# Patient Record
Sex: MALE | Race: WHITE | Employment: OTHER | URBAN - METROPOLITAN AREA
[De-identification: names, ages, dates, MRNs, and addresses within clinical notes are randomized per-mention and may not be internally consistent; named-entity substitution may affect disease eponyms.]

---

## 2017-09-27 ENCOUNTER — OP HISTORICAL/CONVERTED ENCOUNTER (OUTPATIENT)
Dept: OTHER | Age: 82
End: 2017-09-27

## 2017-10-02 ENCOUNTER — HOSPITAL ENCOUNTER (INPATIENT)
Age: 82
LOS: 8 days | Discharge: NURSING, REHAB OR LONG-TERM CARE | DRG: 070 | End: 2017-10-10
Attending: EMERGENCY MEDICINE | Admitting: INTERNAL MEDICINE
Payer: MEDICARE

## 2017-10-02 ENCOUNTER — APPOINTMENT (OUTPATIENT)
Dept: CT IMAGING | Age: 82
DRG: 070 | End: 2017-10-02
Attending: EMERGENCY MEDICINE
Payer: MEDICARE

## 2017-10-02 ENCOUNTER — APPOINTMENT (OUTPATIENT)
Dept: GENERAL RADIOLOGY | Age: 82
DRG: 070 | End: 2017-10-02
Attending: EMERGENCY MEDICINE
Payer: MEDICARE

## 2017-10-02 DIAGNOSIS — G20 PARKINSON'S DISEASE (HCC): Chronic | ICD-10-CM

## 2017-10-02 DIAGNOSIS — R62.7 FTT (FAILURE TO THRIVE) IN ADULT: ICD-10-CM

## 2017-10-02 DIAGNOSIS — R53.81 PHYSICAL DEBILITY: ICD-10-CM

## 2017-10-02 DIAGNOSIS — R41.0 DELIRIUM: Primary | ICD-10-CM

## 2017-10-02 DIAGNOSIS — Z71.89 GOALS OF CARE, COUNSELING/DISCUSSION: ICD-10-CM

## 2017-10-02 PROBLEM — G93.40 ACUTE ENCEPHALOPATHY: Status: ACTIVE | Noted: 2017-10-02

## 2017-10-02 LAB
ALBUMIN SERPL-MCNC: 3.7 G/DL (ref 3.5–5)
ALBUMIN/GLOB SERPL: 1 {RATIO} (ref 1.1–2.2)
ALP SERPL-CCNC: 76 U/L (ref 45–117)
ALT SERPL-CCNC: 70 U/L (ref 12–78)
ANION GAP SERPL CALC-SCNC: 7 MMOL/L (ref 5–15)
APPEARANCE UR: CLEAR
AST SERPL-CCNC: 193 U/L (ref 15–37)
BACTERIA URNS QL MICRO: NEGATIVE /HPF
BASOPHILS # BLD: 0 K/UL (ref 0–0.1)
BASOPHILS NFR BLD: 0 % (ref 0–1)
BILIRUB SERPL-MCNC: 1.6 MG/DL (ref 0.2–1)
BILIRUB UR QL: NEGATIVE
BUN SERPL-MCNC: 26 MG/DL (ref 6–20)
BUN/CREAT SERPL: 27 (ref 12–20)
CALCIUM SERPL-MCNC: 9.3 MG/DL (ref 8.5–10.1)
CHLORIDE SERPL-SCNC: 103 MMOL/L (ref 97–108)
CO2 SERPL-SCNC: 29 MMOL/L (ref 21–32)
COLOR UR: ABNORMAL
CREAT SERPL-MCNC: 0.97 MG/DL (ref 0.7–1.3)
EOSINOPHIL # BLD: 0 K/UL (ref 0–0.4)
EOSINOPHIL NFR BLD: 1 % (ref 0–7)
EPITH CASTS URNS QL MICRO: ABNORMAL /LPF
ERYTHROCYTE [DISTWIDTH] IN BLOOD BY AUTOMATED COUNT: 13.5 % (ref 11.5–14.5)
GLOBULIN SER CALC-MCNC: 3.8 G/DL (ref 2–4)
GLUCOSE SERPL-MCNC: 100 MG/DL (ref 65–100)
GLUCOSE UR STRIP.AUTO-MCNC: NEGATIVE MG/DL
HCT VFR BLD AUTO: 44 % (ref 36.6–50.3)
HGB BLD-MCNC: 15.5 G/DL (ref 12.1–17)
HGB UR QL STRIP: ABNORMAL
KETONES UR QL STRIP.AUTO: NEGATIVE MG/DL
LACTATE SERPL-SCNC: 0.9 MMOL/L (ref 0.4–2)
LEUKOCYTE ESTERASE UR QL STRIP.AUTO: NEGATIVE
LYMPHOCYTES # BLD: 1 K/UL (ref 0.8–3.5)
LYMPHOCYTES NFR BLD: 17 % (ref 12–49)
MCH RBC QN AUTO: 31.8 PG (ref 26–34)
MCHC RBC AUTO-ENTMCNC: 35.2 G/DL (ref 30–36.5)
MCV RBC AUTO: 90.3 FL (ref 80–99)
MONOCYTES # BLD: 0.5 K/UL (ref 0–1)
MONOCYTES NFR BLD: 10 % (ref 5–13)
NEUTS SEG # BLD: 4 K/UL (ref 1.8–8)
NEUTS SEG NFR BLD: 72 % (ref 32–75)
NITRITE UR QL STRIP.AUTO: NEGATIVE
PH UR STRIP: 6 [PH] (ref 5–8)
PLATELET # BLD AUTO: 186 K/UL (ref 150–400)
POTASSIUM SERPL-SCNC: 4.8 MMOL/L (ref 3.5–5.1)
PROT SERPL-MCNC: 7.5 G/DL (ref 6.4–8.2)
PROT UR STRIP-MCNC: 30 MG/DL
RBC # BLD AUTO: 4.87 M/UL (ref 4.1–5.7)
RBC #/AREA URNS HPF: ABNORMAL /HPF (ref 0–5)
SODIUM SERPL-SCNC: 139 MMOL/L (ref 136–145)
SP GR UR REFRACTOMETRY: 1.03 (ref 1–1.03)
UROBILINOGEN UR QL STRIP.AUTO: 1 EU/DL (ref 0.2–1)
WBC # BLD AUTO: 5.5 K/UL (ref 4.1–11.1)
WBC URNS QL MICRO: ABNORMAL /HPF (ref 0–4)

## 2017-10-02 PROCEDURE — 93005 ELECTROCARDIOGRAM TRACING: CPT

## 2017-10-02 PROCEDURE — 71010 XR CHEST PORT: CPT

## 2017-10-02 PROCEDURE — 51702 INSERT TEMP BLADDER CATH: CPT

## 2017-10-02 PROCEDURE — 36415 COLL VENOUS BLD VENIPUNCTURE: CPT | Performed by: EMERGENCY MEDICINE

## 2017-10-02 PROCEDURE — 65660000000 HC RM CCU STEPDOWN

## 2017-10-02 PROCEDURE — 74011250636 HC RX REV CODE- 250/636: Performed by: EMERGENCY MEDICINE

## 2017-10-02 PROCEDURE — 77030034850

## 2017-10-02 PROCEDURE — 85025 COMPLETE CBC W/AUTO DIFF WBC: CPT | Performed by: EMERGENCY MEDICINE

## 2017-10-02 PROCEDURE — 77030011943

## 2017-10-02 PROCEDURE — 96360 HYDRATION IV INFUSION INIT: CPT

## 2017-10-02 PROCEDURE — 80053 COMPREHEN METABOLIC PANEL: CPT | Performed by: EMERGENCY MEDICINE

## 2017-10-02 PROCEDURE — 87040 BLOOD CULTURE FOR BACTERIA: CPT | Performed by: EMERGENCY MEDICINE

## 2017-10-02 PROCEDURE — 70450 CT HEAD/BRAIN W/O DYE: CPT

## 2017-10-02 PROCEDURE — 99285 EMERGENCY DEPT VISIT HI MDM: CPT

## 2017-10-02 PROCEDURE — 77030011256 HC DRSG MEPILEX <16IN NO BORD MOLN -A

## 2017-10-02 PROCEDURE — 96361 HYDRATE IV INFUSION ADD-ON: CPT

## 2017-10-02 PROCEDURE — 74011250637 HC RX REV CODE- 250/637: Performed by: EMERGENCY MEDICINE

## 2017-10-02 PROCEDURE — 81001 URINALYSIS AUTO W/SCOPE: CPT | Performed by: EMERGENCY MEDICINE

## 2017-10-02 PROCEDURE — 83605 ASSAY OF LACTIC ACID: CPT | Performed by: EMERGENCY MEDICINE

## 2017-10-02 RX ORDER — LEVOFLOXACIN 5 MG/ML
750 INJECTION, SOLUTION INTRAVENOUS EVERY 24 HOURS
Status: DISCONTINUED | OUTPATIENT
Start: 2017-10-02 | End: 2017-10-04

## 2017-10-02 RX ORDER — ACETAMINOPHEN 650 MG/1
650 SUPPOSITORY RECTAL
Status: COMPLETED | OUTPATIENT
Start: 2017-10-02 | End: 2017-10-02

## 2017-10-02 RX ORDER — SODIUM CHLORIDE 0.9 % (FLUSH) 0.9 %
5-10 SYRINGE (ML) INJECTION AS NEEDED
Status: DISCONTINUED | OUTPATIENT
Start: 2017-10-02 | End: 2017-10-10 | Stop reason: HOSPADM

## 2017-10-02 RX ADMIN — ACETAMINOPHEN 650 MG: 650 SUPPOSITORY RECTAL at 20:39

## 2017-10-02 RX ADMIN — SODIUM CHLORIDE 2382 ML: 900 INJECTION, SOLUTION INTRAVENOUS at 20:14

## 2017-10-03 PROBLEM — J20.9 ACUTE BRONCHITIS: Status: ACTIVE | Noted: 2017-10-03

## 2017-10-03 PROBLEM — R53.81 PHYSICAL DEBILITY: Status: ACTIVE | Noted: 2017-10-03

## 2017-10-03 PROBLEM — F03.90 DEMENTIA WITHOUT BEHAVIORAL DISTURBANCE (HCC): Status: ACTIVE | Noted: 2017-10-03

## 2017-10-03 PROBLEM — Z96.89 S/P DEEP BRAIN STIMULATOR PLACEMENT: Chronic | Status: ACTIVE | Noted: 2017-10-03

## 2017-10-03 PROBLEM — G20 PARKINSON'S DISEASE (HCC): Chronic | Status: ACTIVE | Noted: 2017-10-03

## 2017-10-03 LAB
ALBUMIN SERPL-MCNC: 2.6 G/DL (ref 3.5–5)
ALBUMIN/GLOB SERPL: 0.8 {RATIO} (ref 1.1–2.2)
ALP SERPL-CCNC: 54 U/L (ref 45–117)
ALT SERPL-CCNC: 58 U/L (ref 12–78)
AMMONIA PLAS-SCNC: <10 UMOL/L
ANION GAP SERPL CALC-SCNC: 10 MMOL/L (ref 5–15)
AST SERPL-CCNC: 183 U/L (ref 15–37)
ATRIAL RATE: 71 BPM
BASOPHILS # BLD: 0 K/UL (ref 0–0.1)
BASOPHILS NFR BLD: 1 % (ref 0–1)
BILIRUB SERPL-MCNC: 1.5 MG/DL (ref 0.2–1)
BUN SERPL-MCNC: 23 MG/DL (ref 6–20)
BUN/CREAT SERPL: 36 (ref 12–20)
CALCIUM SERPL-MCNC: 7.4 MG/DL (ref 8.5–10.1)
CALCULATED P AXIS, ECG09: 73 DEGREES
CALCULATED R AXIS, ECG10: -58 DEGREES
CALCULATED T AXIS, ECG11: 21 DEGREES
CHLORIDE SERPL-SCNC: 110 MMOL/L (ref 97–108)
CO2 SERPL-SCNC: 23 MMOL/L (ref 21–32)
CREAT SERPL-MCNC: 0.64 MG/DL (ref 0.7–1.3)
DIAGNOSIS, 93000: NORMAL
EOSINOPHIL # BLD: 0.1 K/UL (ref 0–0.4)
EOSINOPHIL NFR BLD: 2 % (ref 0–7)
ERYTHROCYTE [DISTWIDTH] IN BLOOD BY AUTOMATED COUNT: 13.9 % (ref 11.5–14.5)
GLOBULIN SER CALC-MCNC: 3.1 G/DL (ref 2–4)
GLUCOSE SERPL-MCNC: 81 MG/DL (ref 65–100)
HCT VFR BLD AUTO: 36.1 % (ref 36.6–50.3)
HGB BLD-MCNC: 11.8 G/DL (ref 12.1–17)
LACTATE SERPL-SCNC: 0.8 MMOL/L (ref 0.4–2)
LYMPHOCYTES # BLD: 1 K/UL (ref 0.8–3.5)
LYMPHOCYTES NFR BLD: 23 % (ref 12–49)
MCH RBC QN AUTO: 30.6 PG (ref 26–34)
MCHC RBC AUTO-ENTMCNC: 32.7 G/DL (ref 30–36.5)
MCV RBC AUTO: 93.5 FL (ref 80–99)
MONOCYTES # BLD: 0.4 K/UL (ref 0–1)
MONOCYTES NFR BLD: 9 % (ref 5–13)
NEUTS SEG # BLD: 2.9 K/UL (ref 1.8–8)
NEUTS SEG NFR BLD: 65 % (ref 32–75)
P-R INTERVAL, ECG05: 152 MS
PLATELET # BLD AUTO: 146 K/UL (ref 150–400)
POTASSIUM SERPL-SCNC: 4.4 MMOL/L (ref 3.5–5.1)
PROT SERPL-MCNC: 5.7 G/DL (ref 6.4–8.2)
Q-T INTERVAL, ECG07: 400 MS
QRS DURATION, ECG06: 84 MS
QTC CALCULATION (BEZET), ECG08: 434 MS
RBC # BLD AUTO: 3.86 M/UL (ref 4.1–5.7)
SODIUM SERPL-SCNC: 143 MMOL/L (ref 136–145)
VENTRICULAR RATE, ECG03: 71 BPM
WBC # BLD AUTO: 4.4 K/UL (ref 4.1–11.1)

## 2017-10-03 PROCEDURE — 74011250637 HC RX REV CODE- 250/637: Performed by: NURSE PRACTITIONER

## 2017-10-03 PROCEDURE — 85025 COMPLETE CBC W/AUTO DIFF WBC: CPT | Performed by: INTERNAL MEDICINE

## 2017-10-03 PROCEDURE — 83605 ASSAY OF LACTIC ACID: CPT | Performed by: EMERGENCY MEDICINE

## 2017-10-03 PROCEDURE — 65270000029 HC RM PRIVATE

## 2017-10-03 PROCEDURE — 36415 COLL VENOUS BLD VENIPUNCTURE: CPT | Performed by: EMERGENCY MEDICINE

## 2017-10-03 PROCEDURE — 97535 SELF CARE MNGMENT TRAINING: CPT

## 2017-10-03 PROCEDURE — 76450000000

## 2017-10-03 PROCEDURE — 80053 COMPREHEN METABOLIC PANEL: CPT | Performed by: INTERNAL MEDICINE

## 2017-10-03 PROCEDURE — 97165 OT EVAL LOW COMPLEX 30 MIN: CPT

## 2017-10-03 PROCEDURE — 97161 PT EVAL LOW COMPLEX 20 MIN: CPT

## 2017-10-03 PROCEDURE — 97116 GAIT TRAINING THERAPY: CPT

## 2017-10-03 PROCEDURE — 74011250636 HC RX REV CODE- 250/636: Performed by: INTERNAL MEDICINE

## 2017-10-03 PROCEDURE — 65660000000 HC RM CCU STEPDOWN

## 2017-10-03 PROCEDURE — 95816 EEG AWAKE AND DROWSY: CPT | Performed by: NURSE PRACTITIONER

## 2017-10-03 PROCEDURE — 82140 ASSAY OF AMMONIA: CPT | Performed by: NURSE PRACTITIONER

## 2017-10-03 RX ORDER — NALOXONE HYDROCHLORIDE 0.4 MG/ML
0.4 INJECTION, SOLUTION INTRAMUSCULAR; INTRAVENOUS; SUBCUTANEOUS AS NEEDED
Status: DISCONTINUED | OUTPATIENT
Start: 2017-10-03 | End: 2017-10-10 | Stop reason: HOSPADM

## 2017-10-03 RX ORDER — SODIUM CHLORIDE 9 MG/ML
75 INJECTION, SOLUTION INTRAVENOUS CONTINUOUS
Status: DISCONTINUED | OUTPATIENT
Start: 2017-10-03 | End: 2017-10-07

## 2017-10-03 RX ORDER — MORPHINE SULFATE 2 MG/ML
1 INJECTION, SOLUTION INTRAMUSCULAR; INTRAVENOUS
Status: DISCONTINUED | OUTPATIENT
Start: 2017-10-03 | End: 2017-10-10 | Stop reason: HOSPADM

## 2017-10-03 RX ORDER — ACETAMINOPHEN 325 MG/1
650 TABLET ORAL
Status: DISCONTINUED | OUTPATIENT
Start: 2017-10-03 | End: 2017-10-10 | Stop reason: HOSPADM

## 2017-10-03 RX ORDER — CARBIDOPA AND LEVODOPA 25; 100 MG/1; MG/1
1 TABLET, EXTENDED RELEASE ORAL 4 TIMES DAILY
Status: ON HOLD | COMMUNITY
End: 2017-10-07

## 2017-10-03 RX ORDER — SODIUM CHLORIDE 0.9 % (FLUSH) 0.9 %
5-10 SYRINGE (ML) INJECTION EVERY 8 HOURS
Status: DISCONTINUED | OUTPATIENT
Start: 2017-10-03 | End: 2017-10-10 | Stop reason: HOSPADM

## 2017-10-03 RX ORDER — PROCHLORPERAZINE EDISYLATE 5 MG/ML
5 INJECTION INTRAMUSCULAR; INTRAVENOUS
Status: DISCONTINUED | OUTPATIENT
Start: 2017-10-03 | End: 2017-10-10 | Stop reason: HOSPADM

## 2017-10-03 RX ORDER — ENOXAPARIN SODIUM 100 MG/ML
40 INJECTION SUBCUTANEOUS EVERY 24 HOURS
Status: DISCONTINUED | OUTPATIENT
Start: 2017-10-03 | End: 2017-10-10 | Stop reason: HOSPADM

## 2017-10-03 RX ORDER — SODIUM CHLORIDE 0.9 % (FLUSH) 0.9 %
5-10 SYRINGE (ML) INJECTION AS NEEDED
Status: DISCONTINUED | OUTPATIENT
Start: 2017-10-03 | End: 2017-10-10 | Stop reason: HOSPADM

## 2017-10-03 RX ORDER — CARBIDOPA AND LEVODOPA 25; 100 MG/1; MG/1
1 TABLET, EXTENDED RELEASE ORAL 4 TIMES DAILY
Status: DISCONTINUED | OUTPATIENT
Start: 2017-10-03 | End: 2017-10-05

## 2017-10-03 RX ORDER — DM/PE/ACETAMINOPHEN/CHLORPHENR 10-5-325-2
1 TABLET, SEQUENTIAL ORAL DAILY
COMMUNITY

## 2017-10-03 RX ORDER — THERA TABS 400 MCG
1 TAB ORAL DAILY
COMMUNITY

## 2017-10-03 RX ADMIN — Medication 10 ML: at 17:43

## 2017-10-03 RX ADMIN — LEVOFLOXACIN 750 MG: 5 INJECTION, SOLUTION INTRAVENOUS at 21:09

## 2017-10-03 RX ADMIN — ENOXAPARIN SODIUM 40 MG: 40 INJECTION SUBCUTANEOUS at 02:19

## 2017-10-03 RX ADMIN — SODIUM CHLORIDE 75 ML/HR: 900 INJECTION, SOLUTION INTRAVENOUS at 02:20

## 2017-10-03 RX ADMIN — LEVOFLOXACIN 750 MG: 5 INJECTION, SOLUTION INTRAVENOUS at 02:19

## 2017-10-03 RX ADMIN — ENOXAPARIN SODIUM 40 MG: 40 INJECTION SUBCUTANEOUS at 21:09

## 2017-10-03 RX ADMIN — CARBIDOPA AND LEVODOPA 1 TABLET: 25; 100 TABLET, EXTENDED RELEASE ORAL at 18:01

## 2017-10-03 RX ADMIN — Medication 10 ML: at 02:20

## 2017-10-03 RX ADMIN — SODIUM CHLORIDE 75 ML/HR: 900 INJECTION, SOLUTION INTRAVENOUS at 17:43

## 2017-10-03 RX ADMIN — Medication 10 ML: at 21:10

## 2017-10-03 NOTE — CONSULTS
[facility]  [address] [place] [address]  [phone]     Inpatient Neurology Consult  PAUL Langston-BC    Name:   Andrew Sow record #: [id number]  Admission Date: 10/2/2017  Consult Date:  10/03/17    Referring Provider: Dr. Raven Grigsby  Chief Complaint:  [address] of Hx:  Chart review, pt is poor historian minimal info obtained  _____________________________________________________________________    HISTORY OF PRESENT ILLNESS:   Bennett Osorio is a 80 y.o. male with PMH of dementia, PD and post deep brain stimulator placement. The Neurology Service is asked to evaluate for altered mental status, increased from baseline, after admission for confusion and reduced mobility for 4 days. Pt cannot provide HPI with dementia hx and being poor historian. Chart review states patient hasn't been able to speak normally or walk normally for past 4 days. Pt reports he walks with walker at home and he does have history of Parkinson's disease--states since 1960's but I am unclear if this year is accurate. He agrees he has deep brain stimulator in brain for his PD and Dr. Hari Delgado at [facility] placed it. Other than the above, he states his 2 cousins take care of him. He reports he is at [address] [place] [address] now but isn't sure why---I clarified with him. Past Medical History:   Diagnosis Date    Dementia     Parkinson's disease ([facility] [address] 10/3/2017    - deep brain stimulator-- followed by [facility] Dr. Milvia Hickman S/P deep brain stimulator placement 10/3/2017    - for parkinson's disease     No past surgical history on file. No family history on file. Social History     Social History    Marital status: SINGLE     Spouse name: N/A    Number of children: N/A    Years of education: N/A     Occupational History    Not on file.      Social History Main Topics    Smoking status: Unknown If Ever Smoked    Smokeless tobacco: Not on file    Alcohol use Not on file    Drug use: Not on file    Sexual activity: Not on file     Other Topics Concern    Not on file     Social History Narrative    No narrative on file       Objective  Allergies:   No Known Allergies  Outpatient Meds  No current facility-administered medications on file prior to encounter. No current outpatient prescriptions on file prior to encounter. Inpatient Meds    Current Facility-Administered Medications:     sodium chloride (NS) flush 5-10 mL, 5-10 mL, IntraVENous, Q8H, Adrianne Moura MD, 10 mL at 10/03/17 0220    sodium chloride (NS) flush 5-10 mL, 5-10 mL, IntraVENous, PRN, Adrianne Moura MD    acetaminophen (TYLENOL) tablet 650 mg, 650 mg, Oral, Q4H PRN, Adrianne Moura MD    morphine injection 1 mg, 1 mg, IntraVENous, Q4H PRN, Adrianne Moura MD    naloxone [facility]) injection 0.4 mg, 0.4 mg, IntraVENous, PRN, Adrianne Moura MD    prochlorperazine (COMPAZINE) injection 5 mg, 5 mg, IntraVENous, Q8H PRN, Adrianne Moura MD    enoxaparin (LOVENOX) injection 40 mg, 40 mg, SubCUTAneous, Q24H, Adrianne Moura MD, 40 mg at 10/03/17 0219    0.9% sodium chloride infusion, 75 mL/hr, IntraVENous, CONTINUOUS, Adrianne Moura MD, Last Rate: 75 mL/hr at 10/03/17 0220, 75 mL/hr at 10/03/17 0220    sodium chloride (NS) flush 5-10 mL, 5-10 mL, IntraVENous, PRN, Denis Wesley MD    levoFLOXacin (LEVAQUIN) 750 mg in D5W IVPB, 750 mg, IntraVENous, Q24H, Adrianne Moura MD, Last Rate: 100 mL/hr at 10/03/17 0219, 750 mg at 10/03/17 0219  No current outpatient prescriptions on file.     PHYSICAL EXAM  Patient Vitals for the past 12 hrs:   Temp Pulse Resp BP SpO2   10/03/17 1015 - 72 15 126/73 99 %   10/03/17 1000 - 70 17 (!) 117/92 97 %   10/03/17 0945 - 73 17 135/65 95 %   10/03/17 0930 - 62 13 113/74 -   10/03/17 0915 - 63 14 119/68 97 %   10/03/17 0845 - 67 11 104/67 95 %   10/03/17 0830 - 78 15 115/71 97 %   10/03/17 0815 - 66 14 106/60 96 %   10/03/17 0808 98.6 °F (37 °C) 81 17 119/64 96 % 10/03/17 0800 - 79 16 135/71 -   10/03/17 0745 - (!) 59 13 95/60 -   10/03/17 0730 - 69 14 92/60 -   10/03/17 0715 - 76 13 104/59 -   10/03/17 0345 - 73 16 116/62 99 %   10/03/17 0030 - 64 14 107/66 98 %   10/03/17 0000 - 66 15 106/63 (!) 88 %   10/02/17 2330 - 71 13 116/79 -   10/02/17 2245 - 64 15 120/69 96 %        General: Elderly white male in NAD, providing no history    Psych: Affect is calm, cooperative, pleasant   Neck: supple, nontender,  No bruit   Heart: regular rhythm and rate     Lungs: clear BBS   Extremities: no LE edema Skin: no rashes      Neurological Examination:    Mental Status:  Alert, oriented x 1, poor insight and judgement   Commands: follows 1-2 step commands    Language:  Comprehension: reduced     Speech: no dysarthria or aphasia-- bradyphrenia     Cranial Nerves:            I: smell   Not tested    II: visual acuity    deferred    II: visual fields   Full to confrontation    II: pupils   Equal, round, reactive to light    II: optic disc   Not examined    III,VII:     L eye ptosis    III,IV,VI: extraocular muscles    Full EOM, no nystagmus, no intranuclear opthalmoplegia    V: mastication   symmetrical    V: facial sensation:    Equal V1, V2 and V3 bilaterally with LT    VII: facial muscle function     Facial masking, no facial droop    VIII: hearing   Equal bilaterally    IX: soft palate elevation    Uvula midline, elevates symetrically    XI: trapezius strength    4/5    XI: sternocleidomastoid strength   4/5    XI: neck flexion strength    4/5     XII: tongue    Protrudes midline, no fasciculations or atrophy      Strength/Motor   Left:   Proximal:   4 /5     Distal:   3 /5    Right:    Proximal:  4  /5     Distal:   3 /5    Drift:       None             Bulk:  appears symmetric            Tone:  normal      Reflexes:    BR Brachial Patellar Achilles Babinski Startle Glabellar   L 1/4+ 1/4+ NR NT  downgoing NT NT   R 1/4+ /4+ NR NT downgoing NT NT      Sensory: intact on proximal & distal extremity w/ LT and temp bilaterally   Coordination: pt cannot perform   Tremors:  no resting tremors, mild L foot intention tremors   Bradykinesia noted   Significant rigidity bilaterally, > on Left   Bradyphrenia +   Facial masking +   + Ptosis L eye    Gait: deferred   *ROSALINE :  Unable to assess due to reduced comprehension, agitation or reduced LOC. Labs Reviewed  Recent Results (from the past 12 hour(s))   LACTIC ACID    Collection Time: 10/03/17  2:59 AM   Result Value Ref Range    Lactic acid 0.8 0.4 - 2.0 MMOL/L   METABOLIC PANEL, COMPREHENSIVE    Collection Time: 10/03/17  2:59 AM   Result Value Ref Range    Sodium 143 136 - 145 mmol/L    Potassium 4.4 3.5 - 5.1 mmol/L    Chloride 110 (H) 97 - 108 mmol/L    CO2 23 21 - 32 mmol/L    Anion gap 10 5 - 15 mmol/L    Glucose 81 65 - 100 mg/dL    BUN 23 (H) 6 - 20 MG/DL    Creatinine 0.64 (L) 0.70 - 1.30 MG/DL    BUN/Creatinine ratio 36 (H) 12 - 20      GFR est AA >60 >60 ml/min/1.73m2    GFR est non-AA >60 >60 ml/min/1.73m2    Calcium 7.4 (L) 8.5 - 10.1 MG/DL    Bilirubin, total 1.5 (H) 0.2 - 1.0 MG/DL    ALT (SGPT) 58 12 - 78 U/L    AST (SGOT) 183 (H) 15 - 37 U/L    Alk. phosphatase 54 45 - 117 U/L    Protein, total 5.7 (L) 6.4 - 8.2 g/dL    Albumin 2.6 (L) 3.5 - 5.0 g/dL    Globulin 3.1 2.0 - 4.0 g/dL    A-G Ratio 0.8 (L) 1.1 - 2.2     CBC WITH AUTOMATED DIFF    Collection Time: 10/03/17  2:59 AM   Result Value Ref Range    WBC 4.4 4.1 - 11.1 K/uL    RBC 3.86 (L) 4.10 - 5.70 M/uL    HGB 11.8 (L) 12.1 - 17.0 g/dL    HCT 36.1 (L) 36.6 - 50.3 %    MCV 93.5 80.0 - 99.0 FL    MCH 30.6 26.0 - 34.0 PG    MCHC 32.7 30.0 - 36.5 g/dL    RDW 13.9 11.5 - 14.5 %    PLATELET 546 (L) 042 - 400 K/uL    NEUTROPHILS 65 32 - 75 %    LYMPHOCYTES 23 12 - 49 %    MONOCYTES 9 5 - 13 %    EOSINOPHILS 2 0 - 7 %    BASOPHILS 1 0 - 1 %    ABS. NEUTROPHILS 2.9 1.8 - 8.0 K/UL    ABS. LYMPHOCYTES 1.0 0.8 - 3.5 K/UL    ABS. MONOCYTES 0.4 0.0 - 1.0 K/UL    ABS.  EOSINOPHILS 0.1 0.0 - 0.4 K/UL    ABS. BASOPHILS 0.0 0.0 - 0.1 K/UL     Imaging  Reviewed:   CT Results (recent):    Results from Hospital Encounter encounter on 10/02/17   CT HEAD WO CONT   Narrative EXAM:  CT HEAD WO CONT    INDICATION:   Altered mental status    COMPARISON: None. CONTRAST:  None. TECHNIQUE:    Unenhanced CT of the head was performed using 5 mm images. Brain and bone  windows were generated. Coronal and sagittal reformatted images were then  obtained. CT dose reduction was achieved through the use of a standardized  protocol tailored for this examination and automatic exposure control for dose  modulation. FINDINGS:  Bilateral frontal deep stimulator devices are present. The ventricles and sulci are normal in size, shape and configuration and  midline. There is no intracranial hemorrhage, extra-axial collection, mass, mass effect  or midline shift. The basilar cisterns are open. No acute infarct is identified. The visualized portions of the paranasal sinuses and mastoid air cells are  clear. Impression IMPRESSION:   Bifrontal deep stimulator devices. No intracranial bleed or shift of the midline. MRI Results (recent):  No results found for this or any previous visit.    _____________________________________________________________________    Review of Systems: pt cannot provide history with dementia he is a poor historian  _____________________________________________________________________  Impression  80 y.o. male with onset of  AMS more from baseline with hx of dementia. Exam findings of thin WM with severe bradykinesia and rigidity bilaterally, greater on Left extremities. Imaging reviewed: CT head noting deep brain stimulator. Labs : noting a few LFT elevations. Feel with hx of dementia and PD, patient has likely been on a downward path at home where his 2 cousins take care of him. Refer to plan below. Assessment:  1. AMS  2. Dementia hx  3. PD hx  4. s/p DBS- bilateral leads    Plan  · Medications:  Would like to restart home Sinemet if he is on this with hx of PD, once PTA med list is reconciled   · Testing:  Check Ammonia and EEG and await results. Unable to perform MRI brain at this time but do not feel this is CVA  · Pt to f/u with primary neurologist after discharge    ·   Continue great care by collaborating care team and nursing staff. ·  Testing discussed with patient --- any questions answered. My collaborating care team physician may have further recommendations. On DVT Prophylaxis yes no   Continue Lovenox while inpatient x      Care Plan discussed with:  Patient x   Family    RN x   Care Manager    Consultant/Specialist:     Patient will be discussed with Dr. Chai Whyte  ______________________________________________________________  Hospital Problems  Date Reviewed: 10/3/2017          Codes Class Noted POA    Acute bronchitis ICD-10-CM: J20.9  ICD-9-CM: 466.0  10/3/2017 Yes        Physical debility ICD-10-CM: R53.81  ICD-9-CM: 799.3  10/3/2017 Yes        Dementia without behavioral disturbance ICD-10-CM: F03.90  ICD-9-CM: 294.20  10/3/2017 Yes        Parkinson's disease ([facility] [address] (Chronic) ICD-10-CM: Ashley Boston  ICD-9-CM: 332.0  10/3/2017 Unknown    Overview Signed 10/3/2017 10:21 AM by Jose Alfredo Granger NP     - deep brain stimulator-- followed by [facility] Dr. Bedford Cockayne             S/P deep brain stimulator placement (Chronic) ICD-10-CM: Z96.89  ICD-9-CM: V45.89  10/3/2017 Unknown    Overview Signed 10/3/2017 10:21 AM by Jose Alfredo Granger NP     - for parkinson's disease             * (Principal)Acute encephalopathy ICD-10-CM: G93.40  ICD-9-CM: 348.30  10/2/2017 Yes              *Thank you for allowing Blaynecora Regan, to participate in the care of your patient.     ---Piper Liriano, PAUL  ================================================    ADDENDUM--> Collaborating Care Team Physician:

## 2017-10-03 NOTE — PROGRESS NOTES
Primary Nurse Gabrielle Caba RN and Cheri Isaac RN performed a dual skin assessment on this patient Impairment noted- see wound doc flow sheet  Berny score is 16    Impairment noted: abrasion to left calf. Multiple open areas to sacrum (pink, possible stage 2 pressure ulcers or moisture associated skin damage).

## 2017-10-03 NOTE — ROUTINE PROCESS
TRANSFER - IN REPORT:    Verbal report received from Steve Cole [facility] (name) on Santiago Later  being received from ED (unit) for routine progression of care      Report consisted of patients Situation, Background, Assessment and   Recommendations(SBAR). Information from the following report(s) SBAR, Kardex, ED Summary, Procedure Summary and Intake/Output was reviewed with the receiving nurse. Opportunity for questions and clarification was provided. Assessment completed upon patients arrival to unit and care assumed.

## 2017-10-03 NOTE — ROUTINE PROCESS
Primary Nurse Prashanth Arriaga and Woodard Litten, RN performed a dual skin assessment on this patient Impairment noted- see wound doc flow sheet  Berny score is 18

## 2017-10-03 NOTE — PROGRESS NOTES
10/3/2017   CARE MANAGEMENT NOTE:  CM is following pt in the ER for initial discharge planning. EMR reviewed. CM met with unaccompanied pt who presents as a poor historian for this needs assessment. Per chart, pt is with four days of AMS. Will attempt to contact family for interview.   Fabian

## 2017-10-03 NOTE — PROGRESS NOTES
SPEECH THERAPY SCREENING:  SERVICES ARE INDICATED AND THERAPY ORDER IS REQUIRED    An [place] screening referral was triggered for speech therapy based on results obtained during the nursing admission assessment. The patients chart was reviewed and the patient is appropriate for a skilled therapy evaluation. Please order a consult for speech therapy if you are in agreement and would like an evaluation to be completed. Thank you. NEW speech issues and increased dysphagia. RN felt that patient needed thick liquids.    Kirsten Fontenot, SLP

## 2017-10-03 NOTE — PROGRESS NOTES
Bedside and Verbal shift change report given to Radha Garcia (oncoming nurse) by Thais Mullins RN (offgoing nurse). Report included the following information SBAR, Kardex, MAR, Accordion and Recent Results.

## 2017-10-03 NOTE — PROGRESS NOTES
Palliative Medicine  [facility]: [phone])  [facility]: [phone] ([phone])      Resuscitation Status: Full Code   Durable DNR addressed? [] Yes   [] No    [x] Not Applicable    Advance Care Planning 10/3/2017   Patient's Healthcare Decision Maker is: Legal Next of Kin   Primary Decision Maker Name Sister and Brother are EKTA HARMON [facility]   Primary Decision Maker Relationship to Patient Sibling           Patient / Family Encounter Documentation    Participants (names): Pt, cousin Zayda Rod, Palliative Medicine (Dr. Sarika Armenta, Loan Payton)    Narrative: Met with cousin at bedside in ED. Pt rested through majority of visit but roused when greeted, speech difficult to understand at times but responses were appropriate/accurate, pt able to state current location as [address]. Per cousin, pt lives alone in a home behind Matt's dtr-in-law Nixon Gonzalez, who has been caring for pt since the death of her  (Matt's son) to sepsis 5-6 yrs ago. Per Renetta Eisenberg assists pt with morning routine/meals but pt is able to feed self, uses restroom unassisted. Zayda Rod regularly drives pt to [employer], reports pt has strong ana maria ([address]), enjoys the fellowship and meals at [employer] as well. 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 Pt never , does not have children. NOK are a sister who lives in [address] [place] [address] and a brother who resides in [place]. Zayda Rod stated pt has legal paperwork at home though is unsure of exact contents, may have appointed Nixon Gonzalez as POA but Zayda Rod was unable to state with certainty. Copy requested for chart. Psychosocial Issues Identified: Zayda Rod quite distraught when recounting the events leading to his son's death. Pt was recently in ED at same hospital where Matt's son , which has brought back memories. Goals of Care / Plan: Dr. Sarika Armenta attempted to reach Nixon Gonzalez by phone to discuss pt's care, left message requesting return call.   Further testing may help guide goals of care discussions. Matt Leon hopeful pt will be able to return home, reports pt has been resistant to relocating to a facility. SW will follow for support. Thank you for including Palliative Medicine in the care of Mr. Praneeth Fleming.     Adin Del Rio LCSW, [facility]-  [phone])

## 2017-10-03 NOTE — PROGRESS NOTES
Problem: Self Care Deficits Care Plan (Adult)  Goal: *Acute Goals and Plan of Care (Insert Text)  Occupational Therapy Goals  Initiated 10/3/2017  1. Patient will perform self-feeding with minimal assistance/contact guard assist within 7 day(s). 2. Patient will perform grooming with minimal assistance/contact guard assist within 7 day(s). 3. Patient will perform upper body dressing with moderate assistance within 7 day(s). 4. Patient will perform toilet transfers with moderate assistance within 7 day(s). 5. Patient will perform all aspects of toileting with moderate assistance within 7 day(s). OCCUPATIONAL THERAPY EVALUATION  Patient: Mark Cheng (11 y.o. male)  Date: 10/3/2017  Primary Diagnosis: Acute encephalopathy        Precautions:  fall         ASSESSMENT :  Based on the objective data described below, the patient presents with acute encephalopathy. Patient received supine in bed, confused, giving some history that is unable to be confirmed, as no family present. He is able to state correct hospital, but unable to correctly give full birth date. Patient reports he lives with cousin who takes care of him, but when questioned regarding how much assist, patient denies needing assist with all tasks mentioned. Patient reports he has a deep brain stimulator for his parkinson's. Today, patient is very rigid. He requires total assist x 2 for bed mobility, able to remain in sitting secondary to rigidity and with attempt to stand unable to come to full stand. Patient requires total assist to return to supine. Patient will benefit from skilled intervention to address the above impairments.   Patients rehabilitation potential is considered to be Fair  Factors which may influence rehabilitation potential include:   [ ]             None noted  [X]             Mental ability/status  [X]             Medical condition  [ ]             Home/family situation and support systems  [ ]             Safety awareness  [ ]             Pain tolerance/management  [ ]             Other:        PLAN :  Recommendations and Planned Interventions:  [X]               Self Care Training                  [X]        Therapeutic Activities  [X]               Functional Mobility Training    [ ]        Cognitive Retraining  [X]               Therapeutic Exercises           [X]        Endurance Activities  [X]               Balance Training                   [ ]        Neuromuscular Re-Education  [ ]               Visual/Perceptual Training     [X]   Home Safety Training  [X]               Patient Education                 [X]        Family Training/Education  [ ]               Other (comment):     Frequency/Duration: Patient will be followed by occupational therapy 5 times a week to address goals. Discharge Recommendations: [address] and To Be Determined  Further Equipment Recommendations for Discharge: TBD       SUBJECTIVE:   Patient stated I have a DBS.       OBJECTIVE DATA SUMMARY:   HISTORY:   Past Medical History:   Diagnosis Date    Dementia      Parkinson's disease ([facility] [address] 10/3/2017     - deep brain stimulator-- followed by [facility] deep brain stimulator placement 10/3/2017     - for parkinson's disease   No past surgical history on file.      Prior Level of Function/Home Situation: unclear  Expanded or extensive additional review of patient history:      Home Situation  Home Environment: Private residence  # Steps to Enter: 2  Rails to Enter: Yes  Hand Rails : Right  One/Two Story Residence: One story  Living Alone: No  Support Systems:  (lives with cousin)  Current DME Used/Available at Home: Walker, rolling  Tub or Shower Type: Tub/Shower combination  [X]  Right hand dominant             [ ]  Left hand dominant     EXAMINATION OF PERFORMANCE DEFICITS:  Cognitive/Behavioral Status:  Neurologic State: Confused;Drowsy  Orientation Level:  (unable to state birthday, but names hospitals)  Cognition: Follows commands     Perseveration: No perseveration noted        Skin: intact as seen, bandage at sacrum      Edema: none noted      Hearing: Auditory  Auditory Impairment: None     Vision/Perceptual:                 Range of Motion:  AROM: Generally decreased, functional (very rigid on both UE/LE)  PROM: Generally decreased, functional (very rigid on both UE/LE)           Strength:  Strength: Generally decreased, functional (very rigid on both UE/LE)        Coordination:  Coordination: Generally decreased, functional             Tone & Sensation:  Tone: Abnormal  Sensation: Intact                       Balance:  Sitting: Impaired; With support  Sitting - Static: Poor (constant support)  Sitting - Dynamic: Not tested  Standing: Impaired  Standing - Static: Poor  Standing - Dynamic : Poor     Functional Mobility and Transfers for ADLs:  Bed Mobility:  Rolling: Total assistance; Additional time;Assist x2  Supine to Sit: Total assistance; Additional time;Assist x2  Sit to Supine: Total assistance; Additional time;Assist x2  Scooting: Total assistance; Additional time;Assist x2     Transfers:  Sit to Stand: Total assistance; Additional time;Assist x2  Stand to Sit: Total assistance; Additional time;Assist x2  Bed to Chair:  (assisted back to bed)  Toilet Transfer :  (unable to transfer, assisted back to bed)     ADL Assessment:  Feeding: Total assistance (simulated )     Oral Facial Hygiene/Grooming: Total assistance     Bathing: Total assistance     Upper Body Dressing: Total assistance     Lower Body Dressing: Total assistance     Toileting:  Total assistance                 ADL Intervention and task modifications:                    Therapeutic Exercise:     Functional Measure:  Barthel Index:      Bathin  Bladder: 0  Bowels: 0  Groomin  Dressin  Feedin  Mobility: 0  Stairs: 0  Toilet Use: 0  Transfer (Bed to Chair and Back): 0  Total: 0         Barthel and G-code impairment scale:  Percentage of impairment CH  0% CI  1-19% CJ  20-39% CK  40-59% CL  60-79% CM  80-99% CN  100%   Barthel Score 0-100 100 99-80 79-60 59-40 20-39 1-19    0   Barthel Score 0-20 20 17-19 13-16 9-12 5-8 1-4 0      The Barthel ADL Index: Guidelines  1. The index should be used as a record of what a patient does, not as a record of what a patient could do. 2. The main aim is to establish degree of independence from any help, physical or verbal, however minor and for whatever reason. 3. The need for supervision renders the patient not independent. 4. A patient's performance should be established using the best available evidence. Asking the patient, friends/relatives and nurses are the usual sources, but direct observation and common sense are also important. However direct testing is not needed. 5. Usually the patient's performance over the preceding 24-48 hours is important, but occasionally longer periods will be relevant. 6. Middle categories imply that the patient supplies over 50 per cent of the effort. 7. Use of aids to be independent is allowed. Mireya Mauro., Barthel, DFilibertoW. (7036). Functional evaluation: the Barthel Index. [facility] (14)2. MILLIE Naik, Di [facility]., Yen French., Oral [facility], [address] [place] [address] (1999). Measuring the change indisability after inpatient rehabilitation; comparison of the responsiveness of the Barthel Index and Functional [place] Measure. Journal of Neurology, Neurosurgery, and Psychiatry, 66(4), 826-307. Gus Powell, N.J.A, ROME Bartlett, & Rito Holly M.A. (2004.) Assessment of post-stroke quality of life in cost-effectiveness studies: The usefulness of the Barthel Index and the EuroQoL-5D. Quality of Life Research, 13, 140-98            G codes: In compliance with CMSs Claims Based Outcome Reporting, the following G-code set was chosen for this patient based on their primary functional limitation being treated:      The outcome measure chosen to determine the severity of the functional limitation was the Barthel Index with a score of 0/100 which was correlated with the impairment scale. · Self Care:               - CURRENT STATUS:    CN - 100% impaired, limited or restricted               - GOAL STATUS:           CM - 80%-99% impaired, limited or restricted               - D/C STATUS:                       ---------------To be determined---------------      Occupational Therapy Evaluation Charge Determination   History Examination Decision-Making   LOW Complexity : Brief history review  MEDIUM Complexity : 3-5 performance deficits relating to physical, cognitive , or psychosocial skils that result in activity limitations and / or participation restrictions MEDIUM Complexity : Patient may present with comorbidities that affect occupational performnce. Miniml to moderate modification of tasks or assistance (eg, physical or verbal ) with assesment(s) is necessary to enable patient to complete evaluation       Based on the above components, the patient evaluation is determined to be of the following complexity level: MEDIUM  Pain:                    Activity Tolerance:   VSS  Please refer to the flowsheet for vital signs taken during this treatment. After treatment:   [ ] Patient left in no apparent distress sitting up in chair  [X] Patient left in no apparent distress in bed  [X] Call bell left within reach  [X] Nursing notified  [ ] Caregiver present  [X] Bed alarm activated      COMMUNICATION/EDUCATION:   The patients plan of care was discussed with: Physical Therapist and Registered Nurse.  [X] Home safety education was provided and the patient/caregiver indicated understanding. [X] Patient/family have participated as able in goal setting and plan of care. [X] Patient/family agree to work toward stated goals and plan of care. [ ] Patient understands intent and goals of therapy, but is neutral about his/her participation.   [ ] Patient is unable to participate in goal setting and plan of care. This patients plan of care is appropriate for delegation to GAGE.      Thank you for this referral.  Mj Quigley OTR/L  Time Calculation: 20 mins

## 2017-10-03 NOTE — CDMP QUERY
Please clarify if this patient is being treated/managed for:    =>Dehydration POA in the setting of AMS requiring IV fluids/lab monitoring   =>Other Explanation of clinical findings  =>Unable to Determine (no explanation of clinical findings)    The medical record reflects the following clinical findings, treatment, and risk factors:    Risk Factors: AMS  Clinical Indicators: Crt 0.97  corrected to 0.64, H/P--The patient and family members present are both poor historians but the patient did receive IV fluids for suspected dehydration and by the time of review, the family member says he was back to baseline  Treatment: 2382 cc IV bolus    Please clarify and document your clinical opinion in the progress notes and discharge summary including the definitive and/or presumptive diagnosis, (suspected or probable), related to the above clinical findings. Please include clinical findings supporting your diagnosis.     Thank you,         Lamin Trent [address] [place] [address]

## 2017-10-03 NOTE — CONSULTS
José Antonio Walker isidoro [address], [address] [facility]   [address] [place] [address] Road       Name:  Luisa Marion   MR#:  [id number]   :  1932   Account #:  [de-identified]    Date of Consultation:  10/03/2017   Date of Adm:  10/02/2017       REASON FOR CONSULTATION: Neurology consultation at the   request of hospitalist staff for evaluation of altered mentation. HISTORY OF PRESENT ILLNESS: The patient is a pleasant 80-year-  old  who as I understand lives at home with a female cousin   who helps manage his affairs. He apparently has background   dementia and also Parkinson disease. He is status post [facility] deep   brain stimulation on both sides from Dr. Toyin Garcia,   Neurosurgery. Current history goes that for the last couple of days, he   has had difficulty with his normal tasking, which includes using a   walker getting around the house. He tells me his cousin helps him, but   I am not sure in how many different capacities and just how detailed it   gets. He has not been able to articulate normally or walk in these   recent days. He currently is [facility] at [facility], but that   was not clarified. He currently uses a walker at home as mentioned. I   am not sure how much he can reasonably due to assist himself, as   previously referenced. SOCIAL HISTORY: He is a never smoker. Alcohol use none. ALLERGIES: NONE KNOWN. MEDICATIONS PRIOR TO ADMISSION: Have apparently not been   finalized, according to nurse practitioner Sylvain Luciano. He may well be on   Sinemet and related support therapy, but this is unknown at this time. The patient, again, cannot give any further detailed information on the   prior to admission status other than what has just been mentioned. IMAGING   1. Here, his head CT confirms bifrontal deep stimulators, appear to be   accordingly placed in the basal ganglion region, nothing acute.    2. Chest x-ray shows no consolidation pattern seen.    ELECTROCARDIOGRAM: Normal sinus rhythm with left anterior   fascicular block. LABORATORY: Additional tests include a CBC with differential with   slightly decreased hemoglobin and hematocrit, decreased platelets   182,394. The urinalysis is positive for protein, moderate blood. No   other distinguishing features. The chemistries show random chloride   110, increased BUN and decreased creatinine, increased   BUN/creatinine ratio. Lactic acid is normal. Ammonia is less than 10. FAMILY HISTORY: Unknown. REVIEW OF SYSTEMS: The patient currently denies any type of   issues or difficulties to me. PHYSICAL EXAMINATION   GENERAL: Pleasant-appearing, elderly, frail-looking white male who is   currently able to give me his name. He actually knows the day and the   date and the month, and even the place. He can follow 1- and 2-step   commands with cueing. VITAL SIGNS: Temperature 98.6, pulse 65, blood pressure 112/74,   respirations 15, pulse oximetry 94% on room air. HEENT: Normocephalic, atraumatic without bruits. Ears, nose and   throat were clear. NECK: He had slightly stooped shoulder posturing. It was rather rigid. He said that is his baseline, nontender. No bruits heard. No gross   lymphadenopathy. Equal carotid upstroke. CHEST: Grossly clear. Shallow breath sounds. No rales, rhonchi, or   wheezes. CARDIOVASCULAR: Distant heart sounds. Currently regular rate and   rhythm without gallops, murmurs or rubs. Pulses 2+ and full. ABDOMEN: Rounded, perhaps distant bowel sounds. EXTREMITIES: Full range of motion to passive stretch, without   cyanosis, clubbing, edema, rash, or birthmarks. No involuntary   movements seen. He does have manifest approaching moderate   cogwheel type rigidity in all 4 extremities. He is overall bradykinetic. NEUROLOGIC: Cranial nerves 2-12 with funduscopic showing brief   glimpse of optic nerve heads, otherwise no detailed retinal features   seen.  Pupils equal and reactive to light. Afferent pupillary defect   negative. Lid fissures symmetric. External appearance normal.   Extraocular motility very slow to initiate, but otherwise intact. No   spontaneous or gaze-evoked nystagmus seen. Facial animation   diminished. Face sensibility grossly intact. Oral exam: Dry mouth. Tongue protrudes in midline. He has very hypophonic speech, but it is   lucid and articulate if you listen closely. CEREBELLAR TESTING: Finger-nose-finger slow but on target, very   slow. In the legs, could not get him to mobilize sufficiently to do the toe-  to-finger, heel-to-shin. Motor exam was otherwise considered nonfocal.   Sensibility below the chin questionably grossly intact to touch,   temperature and vibratory. Reflexes +1 to +2 above the waist, absent   knee and ankle jerks. Toes withdraw to response bilaterally. No clonus,   no Joseph. Again, I do not see any gross tremor at this time and very   strictly bradykinetic. IMPRESSION   1. Debilitation: I agree with nurse practitioner Abdirashid Erazo this is a   complicated case with what may be Parkinson disease, dementia   complex and I think it is far less likely dementia with Lewy bodies. Could also be a combination of Parkinson disease with Alzheimer   disease. I am not sure what his baseline is. Would need physical   therapy (PT) and occupational therapy (OT) to follow up on his care,   neuro checks, fall protocol, figure out what dose of Sinemet supportive   medicine he is on. With his baseline dementia I am not sure what his   capabilities are on any good day. I agree with nurse practitioner Abdirashid Erazo   that he does not have any focal exam, just straight up Parkinson. Would need to follow up with his primary neurologist post-discharge. I   think Care Management needs to be involved and may require some   temporizing setting before he goes home.  His cousin could probably provide   Input on expectations and plan of action at this point if already formulated. 2. Probably in a state of dehydration. I ordered orthostatic blood   pressure and pulse checks if that can be obtained. Thanks for the chance to see this nice gentleman. I am not sure we   have a whole lot more to add other than what has been mentioned.         Sandra Alves MD      [facility] Aretha Leyden   D:  10/03/2017   16:49   T:  10/03/2017   17:19   Job #:  [id number]

## 2017-10-03 NOTE — PROGRESS NOTES
José Antonio Walker [facility]  [facility], [place], [address] [place] [address]  [phone]      Medical Progress Note      NAME: Bev Prado   :  1932  MRM:  [id number]    Date/Time: 10/3/2017  3:16 PM         Subjective:     Chief Complaint:  i'm thirsty    No acute events. Pt is thirsty. Denies cp, sob, abd pain          Objective:       Vitals:          Last 24hrs VS reviewed since prior progress note. Most recent are:    Visit Vitals    /73    Pulse 72    Temp 98.6 °F (37 °C)    Resp 15    Ht 6' (1.829 m)    Wt 79.4 kg (175 lb)    SpO2 99%    BMI 23.73 kg/m2     SpO2 Readings from Last 6 Encounters:   10/03/17 99%          Intake/Output Summary (Last 24 hours) at 10/03/17 1516  Last data filed at 10/03/17 0811   Gross per 24 hour   Intake                0 ml   Output              725 ml   Net             -725 ml          Exam:     Physical Exam:    General:  Weak elderly male, uncomfortable, coughing but not in distress   Eyes: Pink conjunctivae, PERRLA with no discharge. Normal eye movements  Respiratory:  No accessory muscle use, clear breath sounds without crackles or wheezes  Cardiovascular:  No JVD or murmurs, regular and normal S1, S2 without thrills, bruits or peripheral edema. GI & :  Soft abdomen, non-tender, non-distended, normoactive bowel sounds with no palpable organomegaly  Heme:  No cervical or axillary adenopathy. Musculoskeletal:  No cyanosis, clubbing, atrophy or deformities  Skin:  No rashes, bruising or ulcers   Neurological: Awake and alert, speech is slow. Decreased neck movement but non tender. No facial droop. Appears hypertonic.  Limited exam.   Psychiatric:  Has no insight to illness      Medications Reviewed: (see below)    Lab Data Reviewed: (see below)    ______________________________________________________________________    Medications:     Current Facility-Administered Medications   Medication Dose Route Frequency    sodium chloride (NS) flush 5-10 mL  5-10 mL IntraVENous Q8H    sodium chloride (NS) flush 5-10 mL  5-10 mL IntraVENous PRN    acetaminophen (TYLENOL) tablet 650 mg  650 mg Oral Q4H PRN    morphine injection 1 mg  1 mg IntraVENous Q4H PRN    naloxone (NARCAN) injection 0.4 mg  0.4 mg IntraVENous PRN    prochlorperazine (COMPAZINE) injection 5 mg  5 mg IntraVENous Q8H PRN    enoxaparin (LOVENOX) injection 40 mg  40 mg SubCUTAneous Q24H    0.9% sodium chloride infusion  75 mL/hr IntraVENous CONTINUOUS    carbidopa-levodopa ER (SINEMET CR)  mg per tablet 1 Tab  1 Tab Oral TID    sodium chloride (NS) flush 5-10 mL  5-10 mL IntraVENous PRN    levoFLOXacin (LEVAQUIN) 750 mg in D5W IVPB  750 mg IntraVENous Q24H     Current Outpatient Prescriptions   Medication Sig    carbidopa-levodopa ER (SINEMET CR)  mg per tablet Take 1 Tab by mouth four (4) times daily. The patient takes his medication at 7am, 11am, 3pm, and 7pm    therapeutic multivitamin (THERA) tablet Take 1 Tab by mouth daily.  glucosamine (GLUCOSAMINE RELIEF) 1,000 mg tab Take 1 Tab by mouth daily. Lab Review:     Recent Labs      10/03/17   0259  10/02/17   2024   WBC  4.4  5.5   HGB  11.8*  15.5   HCT  36.1*  44.0   PLT  146*  186     Recent Labs      10/03/17   0259  10/02/17   2024   NA  143  139   K  4.4  4.8   CL  110*  103   CO2  23  29   GLU  81  100   BUN  23*  26*   CREA  0.64*  0.97   CA  7.4*  9.3   ALB  2.6*  3.7   SGOT  183*  193*   ALT  58  70     No components found for: GLPOC         Assessment / Plan:     Acute encephalopathy): unclear etiology. Worsening dementia vs Parkinson's disease vs acute infection. Per family still not back to baseline. UA ok, CXR normal. Treating empirically for possible bronchitis. CT head unremarkable. Consult neurology     Acute bronchitis: Intermittent low grade fever. Continue IV levofloxacin. ? Dysphagia; ?aspiration. Consult speech therapy     ? Dementia without behavioral disturbance (10/3/2017): unclear if he is taking any home medications. Consult palliative      Physical debility (10/3/2017): consult PT, OT    Discussed with family member who states pt is not at baseline. He was recently admitted for dehydration last week at OSH.        Total time spent with patient: [address] discussed with: Patient, Family and >50% of time spent in counseling and coordination of care    Discussed:  Care Plan    Prophylaxis:  Lovenox    Disposition:   PT, OT, RN           ___________________________________________________    Attending Physician: Alyce Richardson MD

## 2017-10-03 NOTE — ED PROVIDER NOTES
HPI Comments: 80 y.o. male with past medical history significant for Parkinson's disease who presents to the ED via EMS with chief complaint of altered mental status. EMS reports pt has had altered mental status for the past 4 days per family. Per EMS, pt is normally able to ambulate and speak without difficulty. Per EMS, pt was recently seen at [facility] and given fluids. Pt also complains of leg pain. Pt denies nausea, chest pain, SOB, or headache. There are no other acute medical complaints voiced at this time. Full history, physical exam, and ROS unable to be obtained due to: confusion. Social Hx: unable to obtain    PCP: Crys Luciano MD    Note written by Alannah Enrique, as dictated by Jonny Stringer MD 8:00 PM     The history is provided by the patient and the EMS personnel. History limited by: altered mental status. No past medical history on file. No past surgical history on file. No family history on file. Social History     Social History    Marital status: SINGLE     Spouse name: N/A    Number of children: N/A    Years of education: N/A     Occupational History    Not on file. Social History Main Topics    Smoking status: Not on file    Smokeless tobacco: Not on file    Alcohol use Not on file    Drug use: Not on file    Sexual activity: Not on file     Other Topics Concern    Not on file     Social History Narrative         ALLERGIES: Review of patient's allergies indicates no known allergies. Review of Systems   Unable to perform ROS: Mental status change       Vitals:    10/02/17 1958   BP: 133/74   Pulse: 78   Resp: 18   Temp: 100.2 °F (37.9 °C)   SpO2: 96%   Weight: 79.4 kg (175 lb)   Height: 6' (1.829 m)            Physical Exam   Constitutional:   Moderately ill appearing. Appears somewhat toxic and confused. Smells of urine. HENT:   Head: Atraumatic.    Right Ear: External ear normal.   Left Ear: External ear normal.   Nose: Nose normal. Mouth/Throat: Oropharynx is clear and moist.   Dry mucous membranes. Eyes: Conjunctivae and EOM are normal. Pupils are equal, round, and reactive to light. Neck: Normal range of motion. Neck supple. No JVD present. No thyromegaly present. Cardiovascular: Regular rhythm, normal heart sounds and intact distal pulses. No murmur heard. Slightly tachycardic. Pulmonary/Chest: Effort normal and breath sounds normal. No respiratory distress. He has no wheezes. He has no rales. Lungs clear. Abdominal: Soft. Bowel sounds are normal. He exhibits no distension. There is no tenderness. Musculoskeletal: Normal range of motion. He exhibits no edema. Extremities without erythema or swelling. Neurological: He is alert. Answers simple questions, yes or no only. Arousable, confused . No focal sensory or motor deficits. Skin: Skin is warm and dry. No rash noted. Psychiatric: He has a normal mood and affect. His behavior is normal. Thought content normal.   Nursing note and vitals reviewed. Note written by Alannah Reyes, as dictated by Sue Jaramillo MD 8:01 PM    MDM  Number of Diagnoses or Management Options  Delirium:   Diagnosis management comments: A/P:  Altered mental status of uncertain etiology. CT head look acutely unremarkable. Labs also unremarkable. Urine with no signs of infection. Patient had transient increased temp in ED and has improved slightly with tylenol and ivf. Family described active person who is ambulatory and exercises, etc.  Patient is far from that baseline at this time, though uncertain cause. Doubt CNS infection (no headache, neck pain, leukocytosis, etc). Will admit to hospitalist service for further workup.          Amount and/or Complexity of Data Reviewed  Clinical lab tests: ordered and reviewed  Tests in the radiology section of CPT®: ordered and reviewed  Tests in the medicine section of CPT®: ordered and reviewed  Review and summarize past medical records: yes  Discuss the patient with other providers: yes    Risk of Complications, Morbidity, and/or Mortality  Presenting problems: high  Diagnostic procedures: moderate  Management options: moderate    Patient Progress  Patient progress: stable    ED Course       Procedures    ED EKG interpretation:  Rhythm: normal sinus rhythm; and regular . Rate (approx.): 71; Axis: normal; ST/T wave: normal; no ectopy. Note written by Alannah Beltran, as dictated by Bere Adams MD 8:53 PM     PROGRESS NOTE:  9:22 PM   Lactate negative. CBC normal. Chemistry unremarkable. Chest x-ray shows no acute process. Urinalysis pending. Will admit to hospitalist service for fever, altered mental status, and further evaluation. CONSULT NOTE:  9:43 PM Bere Adams MD spoke with Dr. Mirza Chandler, Consult for Hospitalist.  Discussed available diagnostic tests and clinical findings. He is in agreement with care plans as outlined. Dr. Mirza Chandler will admit pt.

## 2017-10-03 NOTE — H&P
[address]  [phone]    Admission History and Physical      NAME:              Dmitriy Chen   :   1932   MRN:  [id number]     PCP:  Ludivina Limon MD     Date:     10/2/2017     Chief  Complaint: Altered mental status    History Of Presenting Illness:       Mr. Rylie Garcia is a 80 y.o. male who is being admitted for acute encephalopathy. Mr. Rylie Garcia was brought to our Emergency Department today by his family due to an altered mental status worse from baseline. The patient and family members present are both poor historians but the patient did receive IV fluids for suspected dehydration and by the time of review, the family member says he was back to baseline. He had a similar occurrence recently for which he was hydrated at a different hospital and sent home. He had had low grade fever associated with a productive cough, No nausea or vomiting. No headaches as per family. Work up in the ED was unremarkable but due to his weakness, he will be admitted for closer monitoring in light of his advanced age and this being a second admission to hospital.     No Known Allergies    Prior to Admission medications    None known by family       Past Medical History:   Diagnosis Date    Dementia         No past surgical history on file.     Social History   Substance Use Topics    Smoking status: Unknown If Ever Smoked    Smokeless tobacco: Not on file    Alcohol use Not on file      Family history: unobtainable      Review of Systems: unobtainable     Examination:    Constitutional:    Visit Vitals    /66    Pulse 64    Temp 98 °F (36.7 °C)    Resp 14    Ht 6' (1.829 m)    Wt 79.4 kg (175 lb)    SpO2 98%    BMI 23.73 kg/m2      General:  Weak elderly male, uncomfortable, coughing but not in distress   Eyes: Pink conjunctivae, PERRLA with no discharge. Normal eye movements  Ear, Nose, Mouth & Throat: No ottorrhea, rhinorrhea, non tender sinuses, dry mucous membranes  Respiratory:  No accessory muscle use, clear breath sounds without crackles or wheezes  Cardiovascular:  No JVD or murmurs, regular and normal S1, S2 without thrills, bruits or peripheral edema. GI & :  Soft abdomen, non-tender, non-distended, normoactive bowel sounds with no palpable organomegaly  Heme:  No cervical or axillary adenopathy. Musculoskeletal:  No cyanosis, clubbing, atrophy or deformities  Skin:  No rashes, bruising or ulcers   Neurological: Awake and alert, speech is slow. Decreased neck movement but non tender. No facial droop. Appears hypertonic. Limited exam.   Psychiatric:  Has no insight to illness   ________________________________________________________________________    Data Review:    Labs:    Recent Labs      10/02/17   2024   WBC  5.5   HGB  15.5   HCT  44.0   PLT  186     Recent Labs      10/03/17   0259   NA  143   K  4.4   CL  110*   CO2  23   GLU  81   BUN  23*   CREA  0.64*   CA  7.4*   ALB  2.6*   SGOT  183*   ALT  58     No components found for: GLPOC  No results for input(s): PH, PCO2, PO2, HCO3, FIO2 in the last 72 hours. No results for input(s): INR in the last 72 hours. No lab exists for component: INREXT    Radiological Studies:      CT scan head and Chest X-ray - reviewed     I have also reviewed available old medical records. Assessment & Impression:     Mr. Marcelino Pardo is a 80 y.o. male being evaluated for:     Principal Problem:    Acute encephalopathy (10/2/2017)    Active Problems:    Acute bronchitis (10/3/2017)      Physical debility (10/3/2017)      Dementia without behavioral disturbance (10/3/2017)         Plan of management:    Acute encephalopathy (10/2/2017): unclear etiology. Worsening dementia, ? Parkinson's disease vs acute infection. Admit to hospital. Family state this is his baseline.  CT head unremarkable. Consult neurology    Acute bronchitis (10/3/2017): suspected. Intermittent low grade fever. Start IV levofloxacin and follow    ? Dementia without behavioral disturbance (10/3/2017): unclear if he is taking any home medications.  Consult palliative care to address goals of care     Physical debility (10/3/2017): consult PT, OT    Code Status:  Full    Surrogate decision maker: Family    Risk of deterioration: high      Total time spent for the care of the patient: [address] discussed with: Family, Nursing Staff and ED physician    Discussed:  Code Status, Care Plan and D/C Planning    Prophylaxis:  Lovenox    Probable Disposition:  SNF/LTC           ___________________________________________________    Attending Physician: Diana Bess MD

## 2017-10-03 NOTE — ROUTINE PROCESS
Bedside and Verbal shift change report given to Celeste Ge RN (oncoming nurse) by Yg Vazquez RN  (offgoing nurse). Report included the following information SBAR, Kardex, ED Summary, Procedure Summary, Intake/Output, MAR, Accordion and Recent Results.

## 2017-10-03 NOTE — PROGRESS NOTES
[facility]: MED RECONCILIATION    Comments/Recommendations:    Interview conducted with family member Stephan Hyman via phone   Preferred Pharmacy [facility] in [address] [place] [address] PCP [address]. Lauri Huertas. Medication list verified with the PCP office. Milad Soares reports the patient has been stable on his current dose of carbidopa/levadopa for the last four years with no change in dose. She reports he did not receive any medications yesterday as the patient could not swallow and his last dose was on 10/1/17    Medications added:     · Carbidopa/levadopa  · Glucosamine  · Multivitamin      Allergies: Review of patient's allergies indicates no known allergies. Prior to Admission Medications:     Medication Documentation Review Audit       Reviewed by Rafael Freedman PHARMD (Pharmacist) on 10/03/17 at 1109         Medication Sig Documenting Provider Last Dose Status Taking?      carbidopa-levodopa ER (SINEMET CR)  mg per tablet Take 1 Tab by mouth four (4) times daily. The patient takes his medication at 7am, 11am, 3pm, and 7pm Historical Provider 10/1/2017 Active Yes    glucosamine (GLUCOSAMINE RELIEF) 1,000 mg tab Take 1 Tab by mouth daily. Historical Provider 10/1/2017 Active Yes    therapeutic multivitamin (THERA) tablet Take 1 Tab by mouth daily.  Historical Provider 10/1/2017 Active Yes                  Thank you,    Cheryle Henry, Mamie, BCPS

## 2017-10-03 NOTE — PROGRESS NOTES
Problem: Mobility Impaired (Adult and Pediatric)  Goal: *Acute Goals and Plan of Care (Insert Text)  Physical Therapy Goals  Initiated 10/3/2017  1. Patient will move from supine to sit and sit to supine , scoot up and down and roll side to side in bed with modified independence within 7 day(s). 2. Patient will transfer from bed to chair and chair to bed with minimal assistance/contact guard assist using the least restrictive device within 7 day(s). 3. Patient will perform sit to stand with minimal assistance/contact guard assist within 7 day(s). 4. Patient will ambulate with minimal assistance/contact guard assist for 50 feet with the least restrictive device within 7 day(s). 5. Patient will ascend/descend 4 stairs with handrail(s) with minimal assistance/contact guard assist within 7 day(s). PHYSICAL THERAPY EVALUATION  Patient: Bev Prado (89 y.o. male)  Date: 10/3/2017  Primary Diagnosis: Acute encephalopathy        Precautions: fall, Deep Brain Stimulator           ASSESSMENT :  Based on the objective data described below, the patient presents with difficulty with ambulation had deep brain stimulator for his parkinson's. Patient very rigid on both LE/ UE. Sit patient up on the edge of chair total assist x 2, sit to stand total assist x 2, sitting and standing balance poor. Unable to stand fully patient have flex posture and rounded shoulder. Assisted back to bed supine total assist notified nurse who agreed to monitor patient. Patient will benefit from skilled intervention to address the above impairments.   Patients rehabilitation potential is considered to be Excellent  Factors which may influence rehabilitation potential include:   [ ]         None noted  [X]         Mental ability/status  [X]         Medical condition  [ ]         Home/family situation and support systems  [X]         Safety awareness  [ ]         Pain tolerance/management  [ ]         Other:        PLAN :  Recommendations and Planned Interventions:  [X]           Bed Mobility Training             [ ]    Neuromuscular Re-Education  [X]           Transfer Training                   [ ]    Orthotic/Prosthetic Training  [X]           Gait Training                         [ ]    Modalities  [X]           Therapeutic Exercises           [ ]    Edema Management/Control  [X]           Therapeutic Activities            [ ]    Patient and Family Training/Education  [ ]           Other (comment):     Frequency/Duration: Patient will be followed by physical therapy  5 times a week to address goals. Discharge Recommendations: Home Health and To Be Determined  Further Equipment Recommendations for Discharge: TBD. SUBJECTIVE:   Patient stated ok.       OBJECTIVE DATA SUMMARY:   HISTORY:    Past Medical History:   Diagnosis Date    Dementia      Parkinson's disease ([facility] [address] 10/3/2017     - deep brain stimulator-- followed by Latanya Fleischer Dr. Curvin Fujisawa S/P deep brain stimulator placement 10/3/2017     - for parkinson's disease   No past surgical history on file. Prior Level of Function/Home Situation: modified independent with rolling walker and lives with his cousins. Need to verify with family patient has dementia  Personal factors and/or comorbidities impacting plan of care:      Home Situation  Home Environment: Private residence  # Steps to Enter: 2  Rails to Enter: Yes  Hand Rails : Right  One/Two Story Residence: One story  Living Alone: No  Support Systems:  (lives with cousin)  Current DME Used/Available at Home: Walker, rolling  Tub or Shower Type: Tub/Shower combination     EXAMINATION/PRESENTATION/DECISION MAKING:   Critical Behavior:  Neurologic State: Confused, Drowsy  Orientation Level:  (unable to state birthday, but names hospital)  Cognition: Follows commands     Hearing:   Auditory  Auditory Impairment: None     Range Of Motion:  AROM: Generally decreased, functional (very rigid on both UE/LE)           PROM: Generally decreased, functional (very rigid on both UE/LE)           Strength:    Strength: Generally decreased, functional (very rigid on both UE/LE)                    Tone & Sensation:   Tone: Abnormal              Sensation: Intact               Coordination:  Coordination: Generally decreased, functional  Vision:      Functional Mobility:  Bed Mobility:  Rolling: Total assistance; Additional time;Assist x2  Supine to Sit: Total assistance; Additional time;Assist x2  Sit to Supine: Total assistance; Additional time;Assist x2  Scooting: Total assistance; Additional time;Assist x2  Transfers:  Sit to Stand: Total assistance; Additional time;Assist x2  Stand to Sit: Total assistance; Additional time;Assist x2  Stand Pivot Transfers: Assist x2     Bed to Chair:  (assisted back to bed)              Balance:   Sitting: Impaired; With support  Sitting - Static: Poor (constant support)  Sitting - Dynamic: Not tested  Standing: Impaired  Standing - Static: Poor  Standing - Dynamic : Poor  Ambulation/Gait Training:                                                                    Therapeutic Exercises:    Instructed patient to continue active range of motion exercise on both legs while up on bed. Functional Measure:  Barthel Index:      Bathin  Bladder: 0  Bowels: 0  Groomin  Dressin  Feedin  Mobility: 0  Stairs: 0  Toilet Use: 0  Transfer (Bed to Chair and Back): 0  Total: 0         Barthel and G-code impairment scale:  Percentage of impairment CH  0% CI  1-19% CJ  20-39% CK  40-59% CL  60-79% CM  80-99% CN  100%   Barthel Score 0-100 100 99-80 79-60 59-40 20-39 1-19    0   Barthel Score 0-20 20 17-19 13-16 9-12 5-8 1-4 0      The Barthel ADL Index: Guidelines  1. The index should be used as a record of what a patient does, not as a record of what a patient could do. 2. The main aim is to establish degree of independence from any help, physical or verbal, however minor and for whatever reason.   3. The need for supervision renders the patient not independent. 4. A patient's performance should be established using the best available evidence. Asking the patient, friends/relatives and nurses are the usual sources, but direct observation and common sense are also important. However direct testing is not needed. 5. Usually the patient's performance over the preceding 24-48 hours is important, but occasionally longer periods will be relevant. 6. Middle categories imply that the patient supplies over 50 per cent of the effort. 7. Use of aids to be independent is allowed. Eva Morales., Barthel, D.W. (216). Functional evaluation: the Barthel Index. [facility] (14)2. Evans Mcpherson ifrah MILLIE Holliday, Keny Purvis., Oneida Lagunas., Tomas, [address] [place] [address] Ave (1999). Measuring the change indisability after inpatient rehabilitation; comparison of the responsiveness of the Barthel Index and Functional [place] Measure. Journal of Neurology, Neurosurgery, and Psychiatry, 66(4), 830-648. Margarette Ferrell, N.J.A, ROME Bartlett, & Adrianne Chao MFilibertoA. (2004.) Assessment of post-stroke quality of life in cost-effectiveness studies: The usefulness of the Barthel Index and the EuroQoL-5D. Quality of Life Research, 13, 267-84         G codes: In compliance with CMSs Claims Based Outcome Reporting, the following G-code set was chosen for this patient based on their primary functional limitation being treated: The outcome measure chosen to determine the severity of the functional limitation was the barthel  with a score of 0/100 which was correlated with the impairment scale.       · Mobility - Walking and Moving Around:               - CURRENT STATUS:    CN - 100% impaired, limited or restricted               - GOAL STATUS:           CM - 80%-99% impaired, limited or restricted               - D/C STATUS:                       ---------------To be determined---------------      Physical Therapy Evaluation Charge Determination History Examination Presentation Decision-Making   MEDIUM  Complexity : 1-2 comorbidities / personal factors will impact the outcome/ POC  MEDIUM Complexity : 3 Standardized tests and measures addressing body structure, function, activity limitation and / or participation in recreation  MEDIUM Complexity : Evolving with changing characteristics  Other outcome measures barthel  MEDIUM      Based on the above components, the patient evaluation is determined to be of the following complexity level: MEDIUM     Pain:                    Activity Tolerance:   Good. Please refer to the flowsheet for vital signs taken during this treatment. After treatment:   [ ]         Patient left in no apparent distress sitting up in chair  [X]         Patient left in no apparent distress in bed  [X]         Call bell left within reach  [X]         Nursing notified  [ ]         Caregiver present  [X]         Bed alarm activated      COMMUNICATION/EDUCATION:   The patients plan of care was discussed with: Occupational Therapist, Registered Nurse and patient. [X]         Fall prevention education was provided and the patient/caregiver indicated understanding. [X]         Patient/family have participated as able in goal setting and plan of care. [X]         Patient/family agree to work toward stated goals and plan of care. [ ]         Patient understands intent and goals of therapy, but is neutral about his/her participation. [ ]         Patient is unable to participate in goal setting and plan of care. Thank you for this referral.  Tyra Torres, PT,WCC.    Time Calculation: 25 mins

## 2017-10-03 NOTE — PROGRESS NOTES
[facility]: MED RECONCILIATION    Comments/Recommendations: The patient is unable to complete the interview with the pharmacist. Family is not present. Pharmacist called family, Sheri Guajardo, with no answer. Voicemail left requesting call back. Nurse Rosa notified.     Thank you,    Terrie Healy, PharmD, BCPS

## 2017-10-03 NOTE — CONSULTS
Palliative Medicine Consult  Johnnie: [phone] ([phone])    Patient Name: Dmitriy Chen  YOB: 1932    Date of Initial Consult: 10/3/17  Reason for Consult: Care decisions  Requesting Provider: Dr. Maynor Rivas   Primary Care Physician: Crys Luciano MD      SUMMARY:   Dmitriy Chen is a 80 y.o. with a past history of Parkinson, who was admitted on 10/2/2017 from home with a diagnosis of AMS. Current medical issues leading to Palliative Medicine involvement include: Care decisions. Chart reviewed/HPI-patient admitted to hospital because FTT/poor po intake over the last week or so. His primary caregiver(Tiffanie Rai) is not at bedside. PALLIATIVE DIAGNOSES:   1. GOC discussion  2. Parkinsons disease  3. FTT  4. Debility       PLAN:   1. Natasha([facility]) and I met with patient and his cousin(Matt Rangel). Reviewed current medical issues with Donald Hunter. Explained no obvious cause found on his current decline. Donald Hunter states 2 weeks ago, patient was able to ambulate with assistance, feed himself and still going to [employer]. He is unclear whether he has missed any medications. There apparently has been no change in medications but he states his daughter-in-law(primary caregiver) really knows more of his health issues. He is followed by Neurology at [facility]. 2. GOC-at this time, full restorative measures. We do want to reach out to Teresita Be to have a better idea on how he was doing at home. We do have concerns that he may not be able to fully return to his baseline. 3. AMD-none in the chart. Donald Hunter thinks Teresita Be may have power of  but it is unclear if this is for financial or medical issues or both  4. Have left a message with Teresita Be to call us so we can talk further about patient  5. Code status-for now, he remains a full code but this will be a discussion we will need to have with Tiffanie/family/patient(if able)  6. Symptom management-no acute symptoms for us to manage at this time  7.  Psychosocial-patient is cared for by Tiffanie(daughter in law) of Casimiro Britton. Her (Matt's son)  earlier this year of sepsis. He also had cared for patient. Patient retired from [employer] and worked in [employer]. 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 Seems to have good support from family. Has a sister in [facility] and brother in [place]. Both are elderly and no involved with his care but do check on patient via Matt  8. Initial consult note routed to primary continuity provider  9. Communicated plan of care with: Palliative IDT       GOALS OF CARE / TREATMENT PREFERENCES:   [====Goals of Care====]  GOALS OF CARE:  Patient / health care proxy stated goals: continue all current care      TREATMENT PREFERENCES:   Code Status: Full Code    Advance Care Planning:  Advance Care Planning 10/3/2017   Patient's Healthcare Decision Maker is: Legal Next of Kin   Primary Decision Maker Name Sister and Brother are [facility]   Primary Decision Maker Relationship to Patient Sibling       Other:    The palliative care team has discussed with patient / health care proxy about goals of care / treatment preferences for patient.  [====Goals of Care====]         HISTORY:     History obtained from: chart, a little from patient, Matt(cousin)    CHIEF COMPLAINT: confusion    HPI/SUBJECTIVE:    The patient is:   [x] Verbal and participatory  [] Non-participatory due to:   Patient almost whispers when he speaks.  He denies pain    Clinical Pain Assessment (nonverbal scale for severity on nonverbal patients):   [++++ Clinical Pain Assessment++++]  [++++Pain Severity++++]: Pain: 0  [++++Pain Character++++]:   [++++Pain Duration++++]:   [++++Pain Effect++++]: denies pain  [++++Pain Factors++++]:   [++++Pain Frequency++++]:   [++++Pain Location++++]:   [++++ Clinical Pain Assessment++++]  Duration: for how long has pt been experiencing pain (e.g., 2 days, 1 month, years)  Frequency: how often pain is an issue (e.g., several times per day, once every few days, constant)     FUNCTIONAL ASSESSMENT:     Palliative Performance Scale (PPS):  PPS: 50       PSYCHOSOCIAL/SPIRITUAL SCREENING:     Advance Care Planning:  Advance Care Planning 10/3/2017   Patient's Healthcare Decision Maker is: Legal Next of Kin   Primary Decision Maker Name [facility]   Primary Decision Maker Relationship to Patient Sibling        Any spiritual / Bahai concerns:  [] Yes /  [x] No    Caregiver Burnout:  [] Yes /  [x] No /  [] No Caregiver Present      Anticipatory grief assessment:   [x] Normal  / [] Maladaptive       ESAS Anxiety:  can not assess    ESAS Depression:   can not assess       REVIEW OF SYSTEMS:     Positive and pertinent negative findings in ROS are noted above in HPI. The following systems were [] reviewed / [x] unable to be reviewed as noted in HPI  Other findings are noted below. Systems: constitutional, ears/nose/mouth/throat, respiratory, gastrointestinal, genitourinary, musculoskeletal, integumentary, neurologic, psychiatric, endocrine. Positive findings noted below. Modified ESAS Completed by: provider   Fatigue: 4 Drowsiness: 4     Pain: 0     Nausea: 0   Anorexia: 1 Dyspnea: 1     Constipation: No     Stool Occurrence(s): 1        PHYSICAL EXAM:     From RN flowsheet:  Wt Readings from Last 3 Encounters:   10/02/17 175 lb (79.4 kg)     Blood pressure 112/74, pulse 65, temperature 98.6 °F (37 °C), resp. rate 15, height 6' (1.829 m), weight 175 lb (79.4 kg), SpO2 94 %.     Pain Scale 1: Numeric (0 - 10)  Pain Intensity 1: 0                 Last bowel movement, if known:     Constitutional: thin, ill appearing, does open his eyes, knows at [facility] and thanks us for seeing him  Eyes: pupils equal, anicteric  ENMT: no nasal discharge, moist mucous membranes  Cardiovascular: regular rhythm, distal pulses intact  Respiratory: breathing slightly labored, symmetric  Gastrointestinal: soft non-tender, +bowel sounds  Musculoskeletal: no deformity, no tenderness to palpation  Skin: warm, dry  Neurologic: following commands, moving all extremities, tremor-all 4 extremities, cogwheeling in UE       HISTORY:     Principal Problem:    Acute encephalopathy (10/2/2017)    Active Problems:    Acute bronchitis (10/3/2017)      Physical debility (10/3/2017)      Dementia without behavioral disturbance (10/3/2017)      Parkinson's disease ([facility] (10/3/2017)      Overview: - deep brain stimulator-- followed by Venu Gray      S/P deep brain stimulator placement (10/3/2017)      Overview: - for parkinson's disease      Past Medical History:   Diagnosis Date    Dementia     Parkinson's disease ([facility] 10/3/2017    - deep brain stimulator-- followed by Venu Park S/P deep brain stimulator placement 10/3/2017    - for parkinson's disease      No past surgical history on file. No family history on file. History reviewed, no pertinent family history.   Social History   Substance Use Topics    Smoking status: Unknown If Ever Smoked    Smokeless tobacco: Not on file    Alcohol use Not on file     No Known Allergies   Current Facility-Administered Medications   Medication Dose Route Frequency    sodium chloride (NS) flush 5-10 mL  5-10 mL IntraVENous Q8H    sodium chloride (NS) flush 5-10 mL  5-10 mL IntraVENous PRN    acetaminophen (TYLENOL) tablet 650 mg  650 mg Oral Q4H PRN    morphine injection 1 mg  1 mg IntraVENous Q4H PRN    naloxone (NARCAN) injection 0.4 mg  0.4 mg IntraVENous PRN    prochlorperazine (COMPAZINE) injection 5 mg  5 mg IntraVENous Q8H PRN    enoxaparin (LOVENOX) injection 40 mg  40 mg SubCUTAneous Q24H    0.9% sodium chloride infusion  75 mL/hr IntraVENous CONTINUOUS    carbidopa-levodopa ER (SINEMET CR)  mg per tablet 1 Tab  1 Tab Oral TID    sodium chloride (NS) flush 5-10 mL  5-10 mL IntraVENous PRN    levoFLOXacin (LEVAQUIN) 750 mg in D5W IVPB  750 mg IntraVENous Q24H     Current Outpatient Prescriptions   Medication Sig    carbidopa-levodopa ER (SINEMET CR)  mg per tablet Take 1 Tab by mouth four (4) times daily. The patient takes his medication at 7am, 11am, 3pm, and 7pm    therapeutic multivitamin (THERA) tablet Take 1 Tab by mouth daily.  glucosamine (GLUCOSAMINE RELIEF) 1,000 mg tab Take 1 Tab by mouth daily. LAB AND IMAGING FINDINGS:     Lab Results   Component Value Date/Time    WBC 4.4 10/03/2017 02:59 AM    HGB 11.8 10/03/2017 02:59 AM    PLATELET 901 92/96/7397 02:59 AM     Lab Results   Component Value Date/Time    Sodium 143 10/03/2017 02:59 AM    Potassium 4.4 10/03/2017 02:59 AM    Chloride 110 10/03/2017 02:59 AM    CO2 23 10/03/2017 02:59 AM    BUN 23 10/03/2017 02:59 AM    Creatinine 0.64 10/03/2017 02:59 AM    Calcium 7.4 10/03/2017 02:59 AM      Lab Results   Component Value Date/Time    AST (SGOT) 183 10/03/2017 02:59 AM    Alk. phosphatase 54 10/03/2017 02:59 AM    Protein, total 5.7 10/03/2017 02:59 AM    Albumin 2.6 10/03/2017 02:59 AM    Globulin 3.1 10/03/2017 02:59 AM     No results found for: INR, PTMR, PTP, PT1, PT2, APTT   No results found for: IRON, FE, TIBC, IBCT, PSAT, FERR   No results found for: PH, PCO2, PO2  No components found for: GLPOC   No results found for: CPK, CKMB             Total time: 70  Counseling / coordination time, spent as noted above: 60  > 50% counseling / coordination?: yes    Prolonged service was provided for  []30 min   []75 min in face to face time in the presence of the patient, spent as noted above. Time Start:   Time End:   Note: this can only be billed with 26736 (initial) or 51538 (follow up). If multiple start / stop times, list each separately.

## 2017-10-04 PROCEDURE — 74011250636 HC RX REV CODE- 250/636: Performed by: INTERNAL MEDICINE

## 2017-10-04 PROCEDURE — 97530 THERAPEUTIC ACTIVITIES: CPT

## 2017-10-04 PROCEDURE — 97535 SELF CARE MNGMENT TRAINING: CPT | Performed by: OCCUPATIONAL THERAPIST

## 2017-10-04 PROCEDURE — 65660000000 HC RM CCU STEPDOWN

## 2017-10-04 PROCEDURE — 74011250637 HC RX REV CODE- 250/637: Performed by: INTERNAL MEDICINE

## 2017-10-04 PROCEDURE — 97110 THERAPEUTIC EXERCISES: CPT

## 2017-10-04 PROCEDURE — 74011250637 HC RX REV CODE- 250/637: Performed by: NURSE PRACTITIONER

## 2017-10-04 PROCEDURE — 92610 EVALUATE SWALLOWING FUNCTION: CPT

## 2017-10-04 RX ORDER — CARBIDOPA AND LEVODOPA 25; 100 MG/1; MG/1
0.5 TABLET ORAL 3 TIMES DAILY
Status: DISCONTINUED | OUTPATIENT
Start: 2017-10-04 | End: 2017-10-05

## 2017-10-04 RX ORDER — AZITHROMYCIN 250 MG/1
250 TABLET, FILM COATED ORAL EVERY EVENING
Status: COMPLETED | OUTPATIENT
Start: 2017-10-04 | End: 2017-10-06

## 2017-10-04 RX ADMIN — CARBIDOPA AND LEVODOPA 1 TABLET: 25; 100 TABLET, EXTENDED RELEASE ORAL at 19:00

## 2017-10-04 RX ADMIN — CARBIDOPA AND LEVODOPA 1 TABLET: 25; 100 TABLET, EXTENDED RELEASE ORAL at 11:00

## 2017-10-04 RX ADMIN — CARBIDOPA AND LEVODOPA 0.5 TABLET: 25; 100 TABLET ORAL at 14:19

## 2017-10-04 RX ADMIN — ENOXAPARIN SODIUM 40 MG: 40 INJECTION SUBCUTANEOUS at 20:44

## 2017-10-04 RX ADMIN — Medication 10 ML: at 20:49

## 2017-10-04 RX ADMIN — Medication 10 ML: at 14:17

## 2017-10-04 RX ADMIN — SODIUM CHLORIDE 75 ML/HR: 900 INJECTION, SOLUTION INTRAVENOUS at 20:53

## 2017-10-04 RX ADMIN — Medication 10 ML: at 05:23

## 2017-10-04 RX ADMIN — CARBIDOPA AND LEVODOPA 1 TABLET: 25; 100 TABLET, EXTENDED RELEASE ORAL at 06:28

## 2017-10-04 RX ADMIN — CARBIDOPA AND LEVODOPA 1 TABLET: 25; 100 TABLET, EXTENDED RELEASE ORAL at 15:00

## 2017-10-04 RX ADMIN — SODIUM CHLORIDE 75 ML/HR: 900 INJECTION, SOLUTION INTRAVENOUS at 09:18

## 2017-10-04 RX ADMIN — CARBIDOPA AND LEVODOPA 0.5 TABLET: 25; 100 TABLET ORAL at 10:48

## 2017-10-04 RX ADMIN — AZITHROMYCIN 250 MG: 250 TABLET, FILM COATED ORAL at 18:41

## 2017-10-04 NOTE — PROGRESS NOTES
Palliative Medicine Consult  Johnnie: [phone] ([id number])    Patient Name: Dilip Gonzalez  YOB: 1932    Date of Initial Consult: 10/3/17  Reason for Consult: Care decisions  Requesting Provider: Dr. Clifton Robertson   Primary Care Physician: Crys Luciano MD      SUMMARY:   Dilip Gonzalez is a 80 y.o. with a past history of Parkinson, who was admitted on 10/2/2017 from home with a diagnosis of AMS. Current medical issues leading to Palliative Medicine involvement include: Care decisions. Chart reviewed/HPI-patient admitted to hospital because FTT/poor po intake over the last week or so. His primary caregiver(Tiffanie Rai) is not at bedside. PALLIATIVE DIAGNOSES:   1. GOC discussion  2. Parkinsons disease  3. FTT  4. Debility       PLAN:   1. Natasha([facility]) and I met with patient. No family at bedside. Patient much more engaged today but at times difficult to understand. He does not recall events from yesterday. He only requesting a cup of coffee. Denies any pain. 2. GOC-at this time, full restorative measures. 3. Have called Paradise Smallwood, primary caregiver again and no answer. Have left her a message to call us back so that we can clarify how he had been doing at home. 4. AMD-none in the chart. Matt(cousin) thinks Paradise Smallwood may have power of  but it is unclear if this is for financial or medical issues or both  5. Code status-continue full code status until we can clarify who is MPOA/decision maker. Do not feel he has capacity to understand that discussion at this time. 6. Symptom management-no acute symptoms for us to manage at this time  7. Psychosocial-patient is cared for by Tiffanie(daughter in law of Angy Robledo). Her (Matt's son)  earlier this year of sepsis. He also had cared for patient. Patient retired from [employer] and worked in [employer]. 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 Seems to have good support from family. Has a sister in [facility] and brother in [place].  Both are elderly and no involved with his care but do check on patient via Matt  8. Initial consult note routed to primary continuity provider  9. Communicated plan of care with: Palliative IDT, Dr. Bryant Moulton / TREATMENT PREFERENCES:   [====Goals of Care====]  GOALS OF CARE:  Patient / health care proxy stated goals: continue all current care      TREATMENT PREFERENCES:   Code Status: Full Code    Advance Care Planning:  Advance Care Planning 10/3/2017   Patient's Healthcare Decision Maker is: Legal Next of Kin   Primary Decision Maker Name Sister and Brother are [facility]   Primary Decision Maker Relationship to Patient Sibling       Other:    The palliative care team has discussed with patient / health care proxy about goals of care / treatment preferences for patient.  [====Goals of Care====]         HISTORY:     History obtained from: chart, a little from patient, Matt(cousin)    CHIEF COMPLAINT: confusion    HPI/SUBJECTIVE:    The patient is:   [x] Verbal and participatory  [] Non-participatory due to:   Patient much more awake. Asking for coffee.  Denies pain    Clinical Pain Assessment (nonverbal scale for severity on nonverbal patients):   [++++ Clinical Pain Assessment++++]  [++++Pain Severity++++]: Pain: 0  [++++Pain Character++++]:   [++++Pain Duration++++]:   [++++Pain Effect++++]: denies pain  [++++Pain Factors++++]:   [++++Pain Frequency++++]:   [++++Pain Location++++]:   [++++ Clinical Pain Assessment++++]  Duration: for how long has pt been experiencing pain (e.g., 2 days, 1 month, years)  Frequency: how often pain is an issue (e.g., several times per day, once every few days, constant)     FUNCTIONAL ASSESSMENT:     Palliative Performance Scale (PPS):  PPS: 50       PSYCHOSOCIAL/SPIRITUAL SCREENING:     Advance Care Planning:  Advance Care Planning 10/3/2017   Patient's Healthcare Decision Maker is: Legal Next of Kin   Primary Decision Maker Name Sister and Brother are [facility]   Primary Decision Maker Relationship to Patient Sibling        Any spiritual / Alevism concerns:  [] Yes /  [x] No    Caregiver Burnout:  [] Yes /  [x] No /  [] No Caregiver Present      Anticipatory grief assessment:   [x] Normal  / [] Maladaptive       ESAS Anxiety:  can not assess    ESAS Depression:   can not assess       REVIEW OF SYSTEMS:     Positive and pertinent negative findings in ROS are noted above in HPI. The following systems were [] reviewed / [x] unable to be reviewed as noted in HPI  Other findings are noted below. Systems: constitutional, ears/nose/mouth/throat, respiratory, gastrointestinal, genitourinary, musculoskeletal, integumentary, neurologic, psychiatric, endocrine. Positive findings noted below. Modified ESAS Completed by: provider   Fatigue: 4 Drowsiness: 4     Pain: 0     Nausea: 0   Anorexia: 1 Dyspnea: 1     Constipation: No     Stool Occurrence(s): 1        PHYSICAL EXAM:     From RN flowsheet:  Wt Readings from Last 3 Encounters:   10/02/17 175 lb (79.4 kg)     Blood pressure 110/60, pulse 68, temperature 98 °F (36.7 °C), resp. rate 15, height 6' (1.829 m), weight 175 lb (79.4 kg), SpO2 97 %.     Pain Scale 1: Adult Nonverbal Pain Scale  Pain Intensity 1: 0                 Last bowel movement, if known:     Constitutional: thin, ill appearing, much more engaged, awake, sitting up in bed  Eyes: pupils equal, anicteric  ENMT: no nasal discharge, moist mucous membranes  Cardiovascular: regular rhythm, distal pulses intact  Respiratory: breathing slightly labored, symmetric  Gastrointestinal: soft non-tender, +bowel sounds  Musculoskeletal: no deformity, no tenderness to palpation  Skin: warm, dry  Neurologic: following commands, moving all extremities, cog-wheeling in UE seems improved and movement is much easier       HISTORY:     Principal Problem:    Acute encephalopathy (10/2/2017)    Active Problems:    Acute bronchitis (10/3/2017)      Physical debility (10/3/2017)      Dementia without behavioral disturbance (10/3/2017)      Parkinson's disease ([facility] (10/3/2017)      Overview: - deep brain stimulator-- followed by Fiorella Delgado      S/P deep brain stimulator placement (10/3/2017)      Overview: - for parkinson's disease      Past Medical History:   Diagnosis Date    Dementia     Parkinson's disease ([facility] 10/3/2017    - deep brain stimulator-- followed by Fiorella Hickman S/P deep brain stimulator placement 10/3/2017    - for parkinson's disease      No past surgical history on file. No family history on file. History reviewed, no pertinent family history.   Social History   Substance Use Topics    Smoking status: Unknown If Ever Smoked    Smokeless tobacco: Not on file    Alcohol use Not on file     No Known Allergies   Current Facility-Administered Medications   Medication Dose Route Frequency    carbidopa-levodopa (SINEMET)  mg per tablet 0.5 Tab  0.5 Tab Oral TID    sodium chloride (NS) flush 5-10 mL  5-10 mL IntraVENous Q8H    sodium chloride (NS) flush 5-10 mL  5-10 mL IntraVENous PRN    acetaminophen (TYLENOL) tablet 650 mg  650 mg Oral Q4H PRN    morphine injection 1 mg  1 mg IntraVENous Q4H PRN    naloxone (NARCAN) injection 0.4 mg  0.4 mg IntraVENous PRN    prochlorperazine (COMPAZINE) injection 5 mg  5 mg IntraVENous Q8H PRN    enoxaparin (LOVENOX) injection 40 mg  40 mg SubCUTAneous Q24H    0.9% sodium chloride infusion  75 mL/hr IntraVENous CONTINUOUS    carbidopa-levodopa ER (SINEMET CR)  mg per tablet 1 Tab  1 Tab Oral QID    sodium chloride (NS) flush 5-10 mL  5-10 mL IntraVENous PRN    levoFLOXacin (LEVAQUIN) 750 mg in D5W IVPB  750 mg IntraVENous Q24H          LAB AND IMAGING FINDINGS:     Lab Results   Component Value Date/Time    WBC 4.4 10/03/2017 02:59 AM    HGB 11.8 10/03/2017 02:59 AM    PLATELET 963 57/05/9505 02:59 AM     Lab Results   Component Value Date/Time    Sodium 143 10/03/2017 02:59 AM    Potassium 4.4 10/03/2017 02:59 AM    Chloride 110 10/03/2017 02:59 AM    CO2 23 10/03/2017 02:59 AM    BUN 23 10/03/2017 02:59 AM    Creatinine 0.64 10/03/2017 02:59 AM    Calcium 7.4 10/03/2017 02:59 AM      Lab Results   Component Value Date/Time    AST (SGOT) 183 10/03/2017 02:59 AM    Alk. phosphatase 54 10/03/2017 02:59 AM    Protein, total 5.7 10/03/2017 02:59 AM    Albumin 2.6 10/03/2017 02:59 AM    Globulin 3.1 10/03/2017 02:59 AM     No results found for: INR, PTMR, PTP, PT1, PT2, APTT   No results found for: IRON, FE, TIBC, IBCT, PSAT, FERR   No results found for: PH, PCO2, PO2  No components found for: GLPOC   No results found for: CPK, CKMB             Total time: 25  Counseling / coordination time, spent as noted above: 20  > 50% counseling / coordination?: yes    Prolonged service was provided for  []30 min   []75 min in face to face time in the presence of the patient, spent as noted above. Time Start:   Time End:   Note: this can only be billed with 81005 (initial) or 86920 (follow up). If multiple start / stop times, list each separately.

## 2017-10-04 NOTE — PROGRESS NOTES
[facility] Neurology  [address] [place] [address] [place]  [phone]     Inpatient Neurology Progress  PAUL Badillo-BC  Name: Rea Osorio     : 1932   MRN: [id number]   Date: 10/4/2017   ______________________________________________________________________  * I have reviewed flowsheet data, labs and previous day's notes. _____________________________________________________________  Addendum 3627:  After case mgmt spoke with Vivien Alonso, pt caregiver, I was able to speak with her. She states the reception where she lives is VERY bad. She states patient could walk at home with cane and Walker. He fed himself, went to [place] with her father-in-law 2x weekly. Didn't have frequent falls. Ate regular diet with thin liquids until after discharge from [facility] for dehydration. After returning home from discharge 2w ago, patient had more difficulty in walking, reduced appetite, difficulty eating heavier food so she prepared softer food which he tolerated better, urinary incontinence and was more stiff with his extremities. She assumed he was admitted to [facility] for dehydration but I told her his Cr was normal on this admission and he is likely generally run down from hx of PD and needs rehab. She didn't have further questions. ---PAUL Pearson      Subjective:   CC:  I am a little better today   ·  RN today reported pt had trouble coughing with thin liquids yesterday so they have thickened the liquids since giving him fluids and he hasn't coughed--- also changed to [address] and he has done better  ·  ROM and movement improved today with Sinemet on board  ·  no complaints  Objective:     Vital Signs:    Visit Vitals    /60 (BP 1 Location: Right arm, BP Patient Position: At rest)    Pulse 68    Temp 98 °F (36.7 °C)    Resp 15    Ht 6' (1.829 m)    Wt 79.4 kg (175 lb)    SpO2 97%    BMI 23.73 kg/m2       O2 Device: Room air       Temp (24hrs), Av.2 °F (36.8 °C), Min:98 °F (36.7 °C), Max:98.4 °F (36.9 °C)       Medications:  Current Facility-Administered Medications   Medication Dose Route Frequency    sodium chloride (NS) flush 5-10 mL  5-10 mL IntraVENous Q8H    sodium chloride (NS) flush 5-10 mL  5-10 mL IntraVENous PRN    acetaminophen (TYLENOL) tablet 650 mg  650 mg Oral Q4H PRN    morphine injection 1 mg  1 mg IntraVENous Q4H PRN    naloxone (NARCAN) injection 0.4 mg  0.4 mg IntraVENous PRN    prochlorperazine (COMPAZINE) injection 5 mg  5 mg IntraVENous Q8H PRN    enoxaparin (LOVENOX) injection 40 mg  40 mg SubCUTAneous Q24H    0.9% sodium chloride infusion  75 mL/hr IntraVENous CONTINUOUS    carbidopa-levodopa ER (SINEMET CR)  mg per tablet 1 Tab  1 Tab Oral QID    sodium chloride (NS) flush 5-10 mL  5-10 mL IntraVENous PRN    levoFLOXacin (LEVAQUIN) 750 mg in D5W IVPB  750 mg IntraVENous Q24H       Physical Exam:   General: Well developed thin WM in NAD  Lungs:    Clear BBS       Cardiac: Regular rate and rhythm   Neck: Supple, no bruits       Extrem: no LE edema          Skin:  Intact, dry    Neurological Exam:  awake, oriented x self/hospital, tracking objects and making eye contact, poor insight and recall  · Speech clear  · Strength:  R: proximal 4+/5 distally 4-/5,     L: proximal 4/5 distal 4-/5  · Reflexes throughout +1/4 and toes downgoing  · Movement exam:  Mild intention tremor L arm and BLE  · +Bradyphrenia   · +++ rigidity bilaterally > on Left  · +++ bradykinesia bilaterally > on left  · ++ hypophonia  ______________________________________________________________________    Review of Systems: Denies:  SOB, angina, HA or visual changes    Impression:   1. Parkinson's Syndrome, severe  2. S/p Deep brain stimulator  3. Dementia, moderate/severe  4.   General debility    Plan:     · Medications: continue Sinemet CR but will add IR Sinemet QID to regimen at 1/2 tab as well  · Most likely needs his DBS re-adjusted more than medications added which is a recommendation once discharged  · Labs: reviewed and stable    · Once discharged, schedule OP appt with primary neurologist -- I cannot obtain name of this MD from patient--- have called both caretakers without success 2 times each    · Continue great care by collaborating care team and nursing staff. · Testing discussed with patient and/or family -- any questions answered. On DVT Prophylaxis yes no   Continue lovenox while inpatient x      Care Plan discussed with:  Patient x   Togn Davey [phone] and Eduardluis armando Candido [phone]    RN: x   Care Manager    Consultant/Specialist:         My collaborating care team physician may have further recommendations. Patient will be discussed with Dr. Rito Mary     ===========================================================  ---Pollygreg Joynerm, ACNP  ______________________________________________________________    ADDENDUM--> Collaborating Care Team Physician:  Neuro:  Patient seen behind NP [facility]. Agree with elements of exam  Including advanced rigidity,bradykinesia,hypophonia and slowing of attention and focus. Has rate limiting ROM of shoulders and a basic nonfocal motor exam. Toes withdrawal and no tremors seen. No complaints. Agree with plans to implement sinemet to advance his motor and ambulation skillset. May need intermediary place of care before he gets home. F/u with OP neurologist. And may need adjust on his DBS as mentioned. Thanks.  ezequiel

## 2017-10-04 NOTE — PROGRESS NOTES
Problem: Dysphagia (Adult)  Goal: *Acute Goals and Plan of Care (Insert Text)  Swallowing goals initaited 10-4-17:  1) Tolerate mech soft, nectars without s/s aspiration by 10-7-17  SPEECH LANGUAGE PATHOLOGY BEDSIDE SWALLOW EVALUATION  Patient: Santiago Willard [de-identified]80 y.o. male)  Date: 10/4/2017  Primary Diagnosis: Acute encephalopathy        Precautions: aspiration         ASSESSMENT :  Based on the objective data described below, the patient presents with mild-moderate oral-pharyngeal dysphagia. He has some reduced chew and moderately slow oral responses, oral clearance and delay in swallow. He is a feeder, which is an aspiration risk. He was admitted with dehydration and FTT. PMH: dementia, parkinson's disease, deep brain stimulators for PD placed at [address] [place] [address]. He lives with a family member. .     Patient will benefit from skilled intervention to address the above impairments. Patients rehabilitation potential is considered to be Fair  Factors which may influence rehabilitation potential include:   [ ]            None noted  [X]            Mental ability/status  [X]            Medical condition  [ ]            Home/family situation and support systems  [ ]            Safety awareness  [ ]            Pain tolerance/management  [ ]            Other:        PLAN :  Recommendations and Planned Interventions:  Continue mech soft, nectars. Frequency/Duration: Patient will be followed by speech-language pathology 3 times a week to address goals. Discharge Recommendations: Home Health and [address]:   Patient stated It needs more salt. OBJECTIVE:       Past Medical History:   Diagnosis Date    Dementia      Parkinson's disease ([facility] [address] 10/3/2017     - deep brain stimulator-- followed by Anisa Ramirez S/P deep brain stimulator placement 10/3/2017     - for parkinson's disease   No past surgical history on file.   Prior Level of Function/Home Situation:   [facility] Environment: Private residence  # Steps to Enter: 2  Rails to Enter: Yes  [employer] : Right  One/Two Story Residence: One story  Living Alone: No  Support Systems:  (lives with cousin)  Current DME Used/Available at Home: Walker, rolling  Tub or Shower Type: Tub/Shower combination  Diet prior to admission: regular, thins  Current Diet:  mech soft, nectars   Cognitive and Communication Status:  Neurologic State: Alert  Orientation Level: Oriented to person, Disoriented to place, Disoriented to situation, Disoriented to time  Cognition: Impaired decision making, Decreased command following, Memory loss  Perception:  (mildly [place])  Perseveration: No perseveration noted  Safety/Judgement: Decreased insight into deficits  Oral Assessment:  Oral Assessment  Labial:  (masked facies)  Dentition: Natural;Limited (worst on top-broken off)  Oral Hygiene: fair  Lingual: No impairment  Velum: No impairment  Mandible: No impairment  P.O. Trials:  Patient Position: upright in bed  Vocal quality prior to P.O.:  (ranged from mild-moderate dysarthria to mild dysarthria. improved with addiitional talking)  Consistency Presented: Mechanical soft; Nectar thick liquid  How Presented: SLP-fed/presented (unable to feed self)   ORAL PHASE:   Bolus Acceptance: Impaired (slow bite. slow response to spoon with liquids)  Bolus Formation/Control: Impaired (reduced chew ?swallowed some solids partially whole)  Type of Impairment: Mastication  Propulsion: No impairment  Oral Residue: Less than 10% of bolus (small flecks of solids throughout oral cavity)   PHARYNGEAL PHASE:   Initiation of Swallow: Delayed (# of seconds)  Laryngeal Elevation: Weak (mild-moderate)  Aspiration Signs/Symptoms:  (none with nectars, mech soft, RN reports coughing on thins yesterday)                          NOMS:   The NOMS functional outcome measure was used to quantify this patient's level of swallowing impairment.   Based on the NOMS, the patient was determined to be at level 5 for swallow function      G Codes: In compliance with CMSs Claims Based Outcome Reporting, the following G-code set was chosen for this patient based the use of the NOMS functional outcome to quantify this patient's level of swallowing impairment. Using the NOMS, the patient was determined to be at level 5 for swallow function which correlates with the CJ= 20-39% level of severity. Based on the objective assessment provided within this note, the current, goal, and discharge g-codes are as follows:     Swallow  Swallowing:   Swallow Current Status CJ= 20-39%   Swallow Goal Status CJ= 20-39%         NOMS Swallowing Levels:  Level 1 (CN): NPO  Level 2 (CM): NPO but takes consistency in therapy  Level 3 (CL): Takes less than 50% of nutrition p.o. and continues with nonoral feedings; and/or safe with mod cues; and/or max diet restriction  Level 4 (CK): Safe swallow but needs mod cues; and/or mod diet restriction; and/or still requires some nonoral feeding/supplements  Level 5 (CJ): Safe swallow with min diet restriction; and/or needs min cues  Level 6 (CI): Independent with p.o.; rare cues; usually self cues; may need to avoid some foods or needs extra time  Level 7 ([address] [place] [address]): Independent for all p.o.  ELISABET. (2003). National Outcomes Measurement System (NOMS): Adult Speech-Language Pathology User's Guide. Pain:  Pain Scale 1: Numeric (0 - 10)  Pain Intensity 1: 0     After treatment:   [ ]            Patient left in no apparent distress sitting up in chair  [ ]            Patient left in no apparent distress in bed  [ ]            Call bell left within reach  [ ]            Nursing notified  [ ]            Caregiver present  [ ]            Bed alarm activated      COMMUNICATION/EDUCATION:   The patients plan of care including recommendations, planned interventions, and recommended diet changes were discussed with: Registered Nurse.      Patient was educated regarding His deficit(s) of DYSPHAGIA  as this relates to His diagnosis of PD. He demonstrated Guarded understanding as evidenced by dementia. [ ]            Posted safety precautions in patient's room. [X]            Patient/family have participated as able in goal setting and plan of care. [X]            Patient/family agree to work toward stated goals and plan of care. [ ]            Patient understands intent and goals of therapy, but is neutral about his/her participation. [ ]            Patient is unable to participate in goal setting and plan of care.      Thank you for this referral.  Lakia Perez, SLP  Time Calculation: 15 mins

## 2017-10-04 NOTE — PROGRESS NOTES
José Antonio Walker [facility]  [address] [facility], [address] [place] [address]  [phone]      Medical Progress Note      NAME: Yohannes Ho   :  1932  MRM:  [id number]    Date/Time: 10/4/2017           Subjective:     Chief Complaint:  i'm ok    No acute events. More responsive today. Denies cp, sob, abd pain          Objective:       Vitals:          Last 24hrs VS reviewed since prior progress note. Most recent are:    Visit Vitals    /54 (BP 1 Location: Right arm, BP Patient Position: At rest)    Pulse 63    Temp 97.8 °F (36.6 °C)    Resp 14    Ht 6' (1.829 m)    Wt 79.4 kg (175 lb)    SpO2 99%    BMI 23.73 kg/m2     SpO2 Readings from Last 6 Encounters:   10/04/17 99%            Intake/Output Summary (Last 24 hours) at 10/04/17 1429  Last data filed at 10/04/17 0401   Gross per 24 hour   Intake                0 ml   Output              975 ml   Net             -975 ml          Exam:     Physical Exam:    General:  Weak elderly male, uncomfortable, coughing but not in distress   Eyes: Pink conjunctivae, PERRLA with no discharge. Normal eye movements  Respiratory:  No accessory muscle use, clear breath sounds without crackles or wheezes  Cardiovascular:  No JVD or murmurs, regular and normal S1, S2 without thrills, bruits or peripheral edema. GI & :  Soft abdomen, non-tender, non-distended, normoactive bowel sounds with no palpable organomegaly  Heme:  No cervical or axillary adenopathy. Musculoskeletal:  No cyanosis, clubbing, atrophy or deformities  Skin:  No rashes, bruising or ulcers   Neurological: Awake and alert, speech is slow. Decreased neck movement but non tender. No facial droop. Appears hypertonic.  Limited exam.   Psychiatric:  Has no insight to illness      Medications Reviewed: (see below)    Lab Data Reviewed: (see below)    ______________________________________________________________________    Medications:     Current Facility-Administered Medications Medication Dose Route Frequency    carbidopa-levodopa (SINEMET)  mg per tablet 0.5 Tab  0.5 Tab Oral TID    azithromycin (ZITHROMAX) tablet 250 mg  250 mg Oral QPM    sodium chloride (NS) flush 5-10 mL  5-10 mL IntraVENous Q8H    sodium chloride (NS) flush 5-10 mL  5-10 mL IntraVENous PRN    acetaminophen (TYLENOL) tablet 650 mg  650 mg Oral Q4H PRN    morphine injection 1 mg  1 mg IntraVENous Q4H PRN    naloxone (NARCAN) injection 0.4 mg  0.4 mg IntraVENous PRN    prochlorperazine (COMPAZINE) injection 5 mg  5 mg IntraVENous Q8H PRN    enoxaparin (LOVENOX) injection 40 mg  40 mg SubCUTAneous Q24H    0.9% sodium chloride infusion  75 mL/hr IntraVENous CONTINUOUS    carbidopa-levodopa ER (SINEMET CR)  mg per tablet 1 Tab  1 Tab Oral QID    sodium chloride (NS) flush 5-10 mL  5-10 mL IntraVENous PRN            Lab Review:     Recent Labs      10/03/17   0259  10/02/17   2024   WBC  4.4  5.5   HGB  11.8*  15.5   HCT  36.1*  44.0   PLT  146*  186     Recent Labs      10/03/17   0259  10/02/17   2024   NA  143  139   K  4.4  4.8   CL  110*  103   CO2  23  29   GLU  81  100   BUN  23*  26*   CREA  0.64*  0.97   CA  7.4*  9.3   ALB  2.6*  3.7   SGOT  183*  193*   ALT  58  70     No components found for: GLPOC         Assessment / Plan:     Acute encephalopathy): unclear etiology. Worsening dementia vs Parkinson's disease vs acute infection. Appears improved today; suggesting dehydration as possible cause. UA ok, CXR normal. CT head unremarkable. Consult neurology     Acute bronchitis:Stop IV levaquin. Complete 5 day course of azithromycin for bronchitis. ? Dysphagia; ?aspiration. Consult speech therapy     ? Dementia without behavioral disturbance (10/3/2017): unclear if he is taking any home medications.  Consult palliative      Physical debility (10/3/2017): consult PT, OT    Parkinsons disease: sinemet is being adjusted    Total time spent with patient: [address] [place] [address] discussed with: Patient, Family and >50% of time spent in counseling and coordination of care    Discussed:  Care Plan    Prophylaxis:  Lovenox    Disposition:   PT, OT, RN           ___________________________________________________    Attending Physician: Mj Amin MD

## 2017-10-04 NOTE — PROGRESS NOTES
Bedside and Verbal shift change report given to Lyndsey Zuniga (oncoming nurse) by Jaxon De Leon RN (offgoing nurse). Report included the following information SBAR, Kardex, MAR, Accordion and Recent Results.

## 2017-10-04 NOTE — PROGRESS NOTES
10/9/2017 6:15 PM Possible acceptance for [facility] health and rehab. Possible for discharge tomorrow. Care manage will have to confirm placement  Juan Alberto Burrows     10/9/2017 2:34 PM Met with Mikayla Lanier (cousin) in the pt's room. Referrals are being sent to [facility], [facility], [address] [place] [address] and  [facility]. REAL Burrows     10/9/2017 1:16 PM Spoke with the pt's cousin, Mikayla Lanier. Discussed placement barriers. SW explained that [address] had not accepted. SW informed him that he needed to pick a wider selection of options. Mikayla Lanier agreeable to considering more placements. EDGARDO met with Dr. Shireen Horn and discussed the pt and issues regarding placement. SW participated in rounds and discussed barriers to placement and plan for SNF. REAL Burrows     10/7/2017 12:28 PM Denied by [address] for placement. There has not been any response from [employer]. [address] is highly unlikely. REAL Burrows     10/5/2017 5:49 PM Call received from Alex Wells with [facility]. SW spoke with the pt and explained that his cousin suggested [facility]. Pt states that he is interested in trying [address]. Referral sent to [address]. Juan Alberto Burrows     10/5/2017 4:29 PM Discussed placement with the MD today. Discussed placement discussed with the pt's cousin, Mikayla Lanier. Gave a rehab list. Mikayla Lanier has expressed interest in [facility]. REAL Burrows       10/4/2017 7:07 PM EDGARDO contacted Vivien Alonso. The cell reception was poor and she had to call the SW back on a land line. The contact for the pt's caregiver Vivien Alonso is [phone] home / [phone] cell. She reports that she is the pt's caregiver. She pt lives with her and a roommate. The house is a 1 story home with with 4-5 step to enter. PCP is Dr. Aldrich Ba. Pharmacy is [facility] in [place] [address]. Vivien Alonso reports that at home the pt uses a walker and a cane when he goes to [employer].  She states that the pt would also feed himself, eat at the table, watch TV, have snacks and also email his brother. She states that the pt was not usually left alone unless she had to go to the store. In that case the pt may be left alone with the phone. She reports that there has not been any situation where the pt has been left alone and fallen. Robyn Borges also reports that she has a stair lift for this pt that needs to be installed. Pt was recently at [facility]. Robyn Borges states he was there from Sept 27th to Sept 28th. Sun Amaya have both reported that a home health agency was set up to see this pt, but never made it out to see the pt. Leti Nieves states that the home health agency went to the wrong address. Neither Robyn Borges nor Leti Nieves know the name of the agency for home health. SW expressed that per therapy the pt is weak and would really benefit from some rehab before going back home. Robyn Laguerreadis responded that she thinks the pt's condition is because of him dehydrated. Robyn Borges went on to say that the pt's deliberately does not drink at home because he does not want to pee so much. Robyn Laguerreadis report that the pt has even requested a condom cath from his Md. [place] continued to explain that this pt needs rehab. Robyn Borges expressed that she was reluctant to put this pt in rehab, because she \"does everything for him. \" Robyn Laguerreadis states, \" I've seen a lot of people go into those places and never come back. \" Bc Colbys that she is also reluctant to help this pt go to a rehab placement because she finds helping him a source of support since she lost her . Robyn Laguerreadis states that she is the pt's financial POA, but has no paperwork. REAL Amezquita     10/4/2017 6:22 PM Went to the pt's room. Met with he and his cousin, Leti Nieves. Leti Nieves states that he lives in the same neighborhood as the patient. Louisarmando Ezequiel reports that the pt lives with his (Matt's) dtr-in-law, Robyn Borges.  This means that the pt's caregiver is related to him through marriage and is the dtr-in-law of the pt cousin Oneal Martinez. In speaking with the pt and his cousin Reed Moore), Oneal Martinez stated that the pt was walking, feeding himself and regularly using a cane to ambulate in [place]. Pt has a sister in [place], [place]. and a brother in [facility]. SW reported that he would call Jose Daniel Birmingham for more information. Oneal Martinez notes that he [place] has poor cellular reception, but is the pt's caregiver and takes care of him. Also he notes that the pt's charted address is not correct and that the pt's actual address is [address] [place] [address], [address] [facility].    REAL Sanchez

## 2017-10-04 NOTE — PROGRESS NOTES
Problem: Mobility Impaired (Adult and Pediatric)  Goal: *Acute Goals and Plan of Care (Insert Text)  Physical Therapy Goals  Initiated 10/3/2017  1. Patient will move from supine to sit and sit to supine , scoot up and down and roll side to side in bed with modified independence within 7 day(s). 2. Patient will transfer from bed to chair and chair to bed with minimal assistance/contact guard assist using the least restrictive device within 7 day(s). 3. Patient will perform sit to stand with minimal assistance/contact guard assist within 7 day(s). 4. Patient will ambulate with minimal assistance/contact guard assist for 50 feet with the least restrictive device within 7 day(s). 5. Patient will ascend/descend 4 stairs with handrail(s) with minimal assistance/contact guard assist within 7 day(s). PHYSICAL THERAPY TREATMENT  Patient: Jaquan Glass (80 y.o. male)  Date: 10/4/2017  Diagnosis: Acute encephalopathy Acute encephalopathy       Precautions:  fall      ASSESSMENT:  Based on the objective data described below, patient tolerated treatment very well. performed some active assistive range of motion exercise on both LE all planes while up on the bed. Nurse in the room and giving medication for the patient, patient still rigid on both LE  performed some gentle stretch for inhibition of tight muscle. Reposition patient up on the bed notified nurse who agreed to continue to monitor patient. Progression toward goals:  [ ]    Improving appropriately and progressing toward goals  [X]    Improving slowly and progressing toward goals  [ ]    Not making progress toward goals and plan of care will be adjusted       PLAN:  Patient continues to benefit from skilled intervention to address the above impairments. Continue treatment per established plan of care. Discharge Recommendations:  Rehab  Further Equipment Recommendations for Discharge:  TBD       SUBJECTIVE:   Patient stated Dilshad Tracy.       OBJECTIVE DATA SUMMARY: Critical Behavior:  Neurologic State: Eyes open to stimulus  Orientation Level: Disoriented to person  Cognition: Impaired decision making     Functional Mobility Training:  Bed Mobility:  Rolling: Total assistance;Assist x2  Supine to Sit: Total assistance;Assist x2              Transfers:      n/t                             Balance:  Sitting: Impaired  Sitting - Static: Not tested  Sitting - Dynamic: Not tested  Standing: Impaired;Pull to stand; With support  Standing - Static: Not tested  Ambulation/Gait Training:      n/t                             Therapeutic Exercises: Active assistive range of motion on both LE all planes with gentle stretching to inhibit tight muscles  Pain:  Pain Scale 1: Adult Nonverbal Pain Scale  Pain Intensity 1: 0              Activity Tolerance:   Fair. Please refer to the flowsheet for vital signs taken during this treatment.   After treatment:   [ ]    Patient left in no apparent distress sitting up in chair  [X]    Patient left in no apparent distress in bed  [X]    Call bell left within reach  [X]    Nursing notified  [ ]    Caregiver present  [X]    Bed alarm activated      COMMUNICATION/COLLABORATION:   The patients plan of care was discussed with: Registered Nurse and patient     Gus Teresa PT   Time Calculation: 23 mins

## 2017-10-04 NOTE — PROGRESS NOTES
Problem: Self Care Deficits Care Plan (Adult)  Goal: *Acute Goals and Plan of Care (Insert Text)  Occupational Therapy Goals  Initiated 10/3/2017  1. Patient will perform self-feeding with minimal assistance/contact guard assist within 7 day(s). 2. Patient will perform grooming with minimal assistance/contact guard assist within 7 day(s). 3. Patient will perform upper body dressing with moderate assistance within 7 day(s). 4. Patient will perform toilet transfers with moderate assistance within 7 day(s). 5. Patient will perform all aspects of toileting with moderate assistance within 7 day(s). OCCUPATIONAL THERAPY TREATMENT  Patient: Jerri White (89 y.o. male)  Date: 10/4/2017  Diagnosis: Acute encephalopathy Acute encephalopathy       Precautions:  fall, skin      ASSESSMENT:  Pt is making slow progress toward goals. Pt with lunch present in room. Assisted pt with self feeding lunch in which he required total A. He demonstrated no initiation or active participation despite total hand over hand assist with utensils and cup placed in hand. Recommend SNF at discharge. Progression toward goals:  [ ]       Improving appropriately and progressing toward goals  [X]       Improving slowly and progressing toward goals  [ ]       Not making progress toward goals and plan of care will be adjusted       PLAN:  Patient continues to benefit from skilled intervention to address the above impairments. Will change treatment plan of care to 3 days a week until progress is demonstrated and then will increase to 5 days a week. Discharge Recommendations:  Skilled Nursing Facility  Further Equipment Recommendations for Discharge:  TBD       SUBJECTIVE:   Patient stated I will feed myself one day.       OBJECTIVE DATA SUMMARY:   Cognitive/Behavioral Status:  Neurologic State: Alert  Orientation Level: Oriented to person;Disoriented to place; Disoriented to situation;Disoriented to time  Cognition: Decreased attention/concentration;Decreased command following; Impaired decision making;Memory loss  Perception: Appears intact  Perseveration: No perseveration noted  Safety/Judgement: Decreased awareness of environment;Decreased awareness of need for assistance;Decreased awareness of need for safety;Decreased insight into deficits; Fall prevention     Functional Mobility and Transfers for ADLs:  Bed Mobility:  Rolling: Total assistance;Assist x2  Supine to Sit: Total assistance;Assist x2     Balance:  Sitting: Impaired  Sitting - Static: Not tested  Sitting - Dynamic: Not tested  Standing: Impaired;Pull to stand; With support  Standing - Static: Not tested     ADL Intervention:  Feeding  Feeding Assistance: Total assistance (dependent) (pt with no initiation or active participation)  Container Management: Total assistance (dependent)  Cutting Food: Total assistance (dependent)  Utensil Management: Total assistance (dependent)  Food to Mouth: Total assistance (dependent)  Drink to Mouth: Total assistance (dependent)  Cues: Physical assistance; Tactile cues provided;Verbal cues provided;Visual cues provided (total hand over hand assist)     Cognitive Retraining  Safety/Judgement: Decreased awareness of environment;Decreased awareness of need for assistance;Decreased awareness of need for safety;Decreased insight into deficits; Fall prevention     Pain:  Pain Scale 1: Numeric (0 - 10)  Pain Intensity 1: 0              Activity Tolerance:   Poor  Please refer to the flowsheet for vital signs taken during this treatment.   After treatment:   [ ] Patient left in no apparent distress sitting up in chair  [X] Patient left in no apparent distress in bed  [X] Call bell left within reach  [X] Nursing notified  [X] Caregiver present - pt's cousin  [X] Bed alarm activated      COMMUNICATION/COLLABORATION:   The patients plan of care was discussed with: Speech Therapist and Registered Nurse     Ezequiel De Los Santos OT  Time Calculation: 34 mins

## 2017-10-04 NOTE — PROGRESS NOTES
Bedside and Verbal shift change report given to Roas Heller (oncoming nurse) by Lisette Parmar (offgoing nurse). Report included the following information SBAR, Kardex and Recent Results.

## 2017-10-04 NOTE — PROGRESS NOTES
Rounded on Sabianism patients and provided Anointing of the Sick and Communion at request of patient    Fr. Rosalie Gonzalez

## 2017-10-04 NOTE — PROGRESS NOTES
Nutrition Assessment:    RECOMMENDATIONS/INTERVENTION(S):   Continue NDD3 ([facility] soft chopped) w nectar liquids per SLP recs  Monitor PO intakes, wt, s/s aspiration  Add Ensure pudding for lunch/dinner, add mighty shake for breakfast - chocolate    ASSESSMENT:   10/4: 80 yr old female admitted for acute encephalopathy, AMS. PMHx: Parkinson's, dementia. Pt currently on Mechanical soft (chopped) Nectar thick liquids. SLP visited pt earlier today. Pt ate ~25% of lunch at RD visit. Documented as eating 75% of breakfast. Pt had help from family member to eat. Pt agreeable to trying Ensure pudding and magic cup, both chocolate flavor. Pt states no wt loss recently. Family states he's likely lost a few lbs (unable to quantify). No previous wt recorded in chart. Pt with obvious clavicular/temporal wasting, subcu fat and muscle loss with bulging vein on the left side of his neck. Stage II p/i to buttocks. 2/2 only meeting 1 criteria for malnutrition (subcu fat/muscle loss), cannot classify pt as malnourished. Unable to quantify wt loss, PO intakes, and no edema. Will continue to monitor if PO intakes <50% of needs. SUBJECTIVE/OBJECTIVE:   Diet Order: Puree, Other (comment) (Nectar)  % Eaten:  Patient Vitals for the past 72 hrs:   % Diet Eaten   10/04/17 0951 75 %     Pertinent Medications: [x] Reviewed    Chemistries:  Lab Results   Component Value Date/Time    Sodium 143 10/03/2017 02:59 AM    Potassium 4.4 10/03/2017 02:59 AM    Chloride 110 10/03/2017 02:59 AM    CO2 23 10/03/2017 02:59 AM    Anion gap 10 10/03/2017 02:59 AM    Glucose 81 10/03/2017 02:59 AM    BUN 23 10/03/2017 02:59 AM    Creatinine 0.64 10/03/2017 02:59 AM    BUN/Creatinine ratio 36 10/03/2017 02:59 AM    GFR est AA >60 10/03/2017 02:59 AM    GFR est non-AA >60 10/03/2017 02:59 AM    Calcium 7.4 10/03/2017 02:59 AM    AST (SGOT) 183 10/03/2017 02:59 AM    Alk.  phosphatase 54 10/03/2017 02:59 AM    Protein, total 5.7 10/03/2017 02:59 AM Albumin 2.6 10/03/2017 02:59 AM    Globulin 3.1 10/03/2017 02:59 AM    A-G Ratio 0.8 10/03/2017 02:59 AM    ALT (SGPT) 58 10/03/2017 02:59 AM      Anthropometrics: Height: 6' (182.9 cm) Weight: 79.4 kg (175 lb)    IBW (%IBW):   ( ) UBW (%UBW):   (  %)    BMI: Body mass index is 23.73 kg/(m^2). This BMI is indicative of:   [] Underweight    [x] Normal    [] Overweight    []  Obesity    []  Extreme Obesity (BMI>40)  Estimated Nutrition Needs (Based on): 1972 Kcals/day (BMR(1517x1.3)) , 79 g (-87g/day(1.0-1.1g/kg)) Protein  Carbohydrate: At Least 130 g/day  Fluids: 2000 mL/day    Last BM: 10/2   [x]Active     []Hyperactive  []Hypoactive       [] Absent   BS  Skin:    [] Intact   [] Incision  [] Breakdown   [] DTI   [] Tears/Excoriation/Abrasion  []Edema [] Other:    Wt Readings from Last 30 Encounters:   10/02/17 79.4 kg (175 lb)      NUTRITION DIAGNOSES:   Problem:  Chewing/masticatory difficulty     Etiology: related to motor causes altered swallow pattern     Signs/Symptoms: as evidenced by SLP pureed/nectar thick liquids      NUTRITION INTERVENTIONS:  Meals/Snacks: General/healthful diet                  GOAL:   Pt will consume >50% of meals and ONS without s/s of aspiration within 2-4 days    Cultural, Shinto, or Ethnic Dietary Needs: None     LEARNING NEEDS (Diet, Food/Nutrient-Drug Interaction):    [x] None Identified   [] Identified and Education Provided/Documented   [] Identified and Pt declined/was not appropriate      [x] Interdisciplinary Care Plan Reviewed/Documented    [x] Discharge Needs:    TBD   [] No Nutrition Related Discharge Needs    NUTRITION RISK:   Pt Is At Nutrition Risk  [x]     No Nutrition Risk Identified  []       PT SEEN FOR:    []  MD Consult: []Calorie Count      []Diabetic Diet Education        []Diet Education     []Electrolyte Management     []General Nutrition Management and Supplements     []Management of Tube Feeding     []TPN Recommendations    [x]  RN Referral:  []MST score >=2     []Enteral/Parenteral Nutrition PTA     [x]Pregnant: Gestational DM or Multigestation                 [] Pressure Ulcer      []  Low BMI      []  Length of Stay       [x] Dysphagia Diet         [] Ventilator      []  Follow-Up      Previous Recommendations:   [] Implemented          [] Not Implemented          [x] Not Applicable    Previous Goal:   [] Met              [] Progressing Towards Goal              [] Not Progressing Towards Goal   [x] Not Applicable              Darío Kruger, [address]   Pager [phone]

## 2017-10-04 NOTE — PROGRESS NOTES
Palliative Medicine Social Work Note      SW made follow up visit along with Palliative Medicine physician Dr. Braulio Marti. No family currently present. Pt was awake in bed, more alert than during previous visit, engaged in conversation but speech difficult to understand at times. Pt stated he is cared for by Colby Bickers (wife of pt's cousin's  son), denied any concerns or needs at home. Pt requested and was provided with fresh cup of coffee (to be thickened by RN). PT now in to work with pt. Discussed plan of care with Dr. Ada Farmer. Dr. Braulio Marti attempted to reach Colby Bickers on 10/3 and again on this date, messages left, return call not yet received. [facility] will continue to follow. Thank you for including Palliative Medicine in the care of Mr. Renuka Bhatia.     [address] [place], [facility]  Palliative Medicine:  [phone])

## 2017-10-04 NOTE — PROGRESS NOTES
Spiritual Care Assessment/Progress Notes    Dmitriy Chen [id number]  [id number]    2/29/1932  80 y.o.  male    Patient Telephone Number: [phone] (home)   Pentecostal Affiliation: Hoahaoism   Language: English   Extended Emergency Contact Information  Primary Emergency Contact: [address] Phone: [phone]  Relation: Other Relative  Secondary Emergency Contact: [address] [place] [address] Avenue Phone: [phone]  Relation: Other Relative   Patient Active Problem List    Diagnosis Date Noted    Acute bronchitis 10/03/2017    Physical debility 10/03/2017    Dementia without behavioral disturbance 10/03/2017    Parkinson's disease ([facility] [address] 10/03/2017    S/P deep brain stimulator placement 10/03/2017    Acute encephalopathy 10/02/2017        Date: 10/4/2017       Level of Pentecostal/Spiritual Activity:  [x]         Involved in ana maria tradition/spiritual practice    []         Not involved in ana maria tradition/spiritual practice  [x]         Spiritually oriented    []         Claims no spiritual orientation    []         seeking spiritual identity  []         Feels alienated from Worship practice/tradition  []         Feels angry about Worship practice/tradition  [x]         Spirituality/Worship tradition is a resource for coping at this time.   []         Not able to assess due to medical condition    Services Provided Today:  []         crisis intervention    []         reading Scriptures  [x]         spiritual assessment    []         prayer  []         empathic listening/emotional support  [x]         rites and rituals (cite in comments)  []         life review     [x]         Worship support  []         theological development   []         advocacy  []         ethical dialog     []         blessing  []         bereavement support    []         support to family  []         anticipatory grief support   []         help with AMD  []         spiritual guidance    []         meditation      Spiritual Care Needs  []         Emotional Support  [x]         Spiritual/Christian Care  []         Loss/Adjustment  []         Advocacy/Referral                /Ethics  []         No needs expressed at               this time  []         Other: (note in               comments)  [address]  [x]         Follow up visits with               pt/family  []         Provide materials  [x]         Schedule sacraments  []         Contact Community               Clergy  []         Follow up as needed  []         Other: (note in               comments)     Comments:   Initial visit with patient at request of staff; patient is requesting communion. Spoke to Speech therapist who shared patient may receive wafer but may not receive any liquid as patient is only able to receive thick liquids at this time. I went and spoke to patient who reiterated his request that he would like to receive communion; he is a member of [employer] in [place] [address]. I contacted Sister Omar Bañuelos to see if she could bring Mormon communion to patient today. I am not sure if [email or web address] has been in today. Patient is listed as Mormon in our census so should be on [employer].      Pastoral care will follow and make sure patient has received communion per request.  [phone]. Visit by: Karyle Corner. Jovita Dial.  Tiffany Bull MA, [facility]    Lead  Profession Development & Advancement

## 2017-10-05 PROCEDURE — 74011250637 HC RX REV CODE- 250/637: Performed by: INTERNAL MEDICINE

## 2017-10-05 PROCEDURE — 65660000000 HC RM CCU STEPDOWN

## 2017-10-05 PROCEDURE — 74011250636 HC RX REV CODE- 250/636: Performed by: INTERNAL MEDICINE

## 2017-10-05 PROCEDURE — 74011250637 HC RX REV CODE- 250/637: Performed by: NURSE PRACTITIONER

## 2017-10-05 RX ORDER — CARBIDOPA AND LEVODOPA 25; 100 MG/1; MG/1
1 TABLET ORAL 3 TIMES DAILY
Status: DISCONTINUED | OUTPATIENT
Start: 2017-10-05 | End: 2017-10-05

## 2017-10-05 RX ORDER — CARBIDOPA AND LEVODOPA 25; 100 MG/1; MG/1
0.5 TABLET ORAL 4 TIMES DAILY
Status: DISCONTINUED | OUTPATIENT
Start: 2017-10-05 | End: 2017-10-10 | Stop reason: HOSPADM

## 2017-10-05 RX ORDER — CARBIDOPA AND LEVODOPA 25; 100 MG/1; MG/1
1 TABLET, EXTENDED RELEASE ORAL 4 TIMES DAILY
Status: DISCONTINUED | OUTPATIENT
Start: 2017-10-05 | End: 2017-10-10 | Stop reason: HOSPADM

## 2017-10-05 RX ADMIN — CARBIDOPA AND LEVODOPA 1 TABLET: 25; 100 TABLET, EXTENDED RELEASE ORAL at 16:52

## 2017-10-05 RX ADMIN — Medication 10 ML: at 22:27

## 2017-10-05 RX ADMIN — CARBIDOPA AND LEVODOPA 1 TABLET: 25; 100 TABLET, EXTENDED RELEASE ORAL at 12:36

## 2017-10-05 RX ADMIN — CARBIDOPA AND LEVODOPA 0.5 TABLET: 25; 100 TABLET ORAL at 19:59

## 2017-10-05 RX ADMIN — AZITHROMYCIN 250 MG: 250 TABLET, FILM COATED ORAL at 16:52

## 2017-10-05 RX ADMIN — CARBIDOPA AND LEVODOPA 1 TABLET: 25; 100 TABLET, EXTENDED RELEASE ORAL at 19:59

## 2017-10-05 RX ADMIN — ENOXAPARIN SODIUM 40 MG: 40 INJECTION SUBCUTANEOUS at 22:27

## 2017-10-05 RX ADMIN — Medication 10 ML: at 16:54

## 2017-10-05 RX ADMIN — CARBIDOPA AND LEVODOPA 0.5 TABLET: 25; 100 TABLET ORAL at 07:00

## 2017-10-05 RX ADMIN — SODIUM CHLORIDE 75 ML/HR: 900 INJECTION, SOLUTION INTRAVENOUS at 12:36

## 2017-10-05 RX ADMIN — CARBIDOPA AND LEVODOPA 0.5 TABLET: 25; 100 TABLET ORAL at 12:35

## 2017-10-05 RX ADMIN — CARBIDOPA AND LEVODOPA 0.5 TABLET: 25; 100 TABLET ORAL at 16:52

## 2017-10-05 RX ADMIN — CARBIDOPA AND LEVODOPA 1 TABLET: 25; 100 TABLET, EXTENDED RELEASE ORAL at 07:00

## 2017-10-05 NOTE — PROGRESS NOTES
[facility] Neurology  [address] [facility] [place]  [phone]     Inpatient Neurology Progress  [facility]  Name: Jaquan Glass     : 1932   MRN: [id number]   Date: 10/5/2017   ______________________________________________________________________  * I have reviewed flowsheet data, labs and previous day's notes.   _____________________________________________________________    Subjective:   CC:  I am ok   ·  more awake, alert today  · Remains very rigid despite slight medication adjustment and not moving extremities passively  · States he is well cared for at home  ·  no overnight events  Objective:     Vital Signs:    Visit Vitals    /81 (BP 1 Location: Right arm, BP Patient Position: At rest)    Pulse (!) 55    Temp 97.7 °F (36.5 °C)    Resp 18    Ht 6' (1.829 m)    Wt 79.4 kg (175 lb)    SpO2 97%    BMI 23.73 kg/m2       O2 Device: Room air       Temp (24hrs), Av °F (36.7 °C), Min:97.5 °F (36.4 °C), Max:98.5 °F (36.9 °C)       Medications:  Current Facility-Administered Medications   Medication Dose Route Frequency    carbidopa-levodopa (SINEMET)  mg per tablet 0.5 Tab  0.5 Tab Oral TID    azithromycin (ZITHROMAX) tablet 250 mg  250 mg Oral QPM    sodium chloride (NS) flush 5-10 mL  5-10 mL IntraVENous Q8H    sodium chloride (NS) flush 5-10 mL  5-10 mL IntraVENous PRN    acetaminophen (TYLENOL) tablet 650 mg  650 mg Oral Q4H PRN    morphine injection 1 mg  1 mg IntraVENous Q4H PRN    naloxone (NARCAN) injection 0.4 mg  0.4 mg IntraVENous PRN    prochlorperazine (COMPAZINE) injection 5 mg  5 mg IntraVENous Q8H PRN    enoxaparin (LOVENOX) injection 40 mg  40 mg SubCUTAneous Q24H    0.9% sodium chloride infusion  75 mL/hr IntraVENous CONTINUOUS    carbidopa-levodopa ER (SINEMET CR)  mg per tablet 1 Tab  1 Tab Oral QID    sodium chloride (NS) flush 5-10 mL  5-10 mL IntraVENous PRN       Physical Exam:   General: Well developed thin WM in NAD  Lungs:    Clear BBS       Cardiac: Regular rate and rhythm   Neck: Supple, no bruits       Extrem: no LE edema          Skin:  Intact, dry    Neurological Exam:  awake, oriented x 2, tracking and making eye contact, reduced insight and recall  · Speech clear and very hypophonic  · Strength:  R: proximal 4+/5 distally 3+/5,     L: proximal 4/5 distal 3/5  · Reflexes throughout +1/4 and toes downgoing  · Movement exam:  Mild intention tremor L arm and BLE  · +Bradyphrenia   · +++ rigidity bilaterally > on Left but R knee with more rigidity than L knee  · +++ bradykinesia bilaterally > on left  · ++ hypophonia  ______________________________________________________________________    Review of Systems: Denies:  SOB, angina, HA or visual changes    Impression:   1. Parkinson's Syndrome, severe  2. S/p Deep brain stimulator  3. Dementia, moderate/severe  4. General debility    Plan:     · Medications: cont Sinemet CR but increase IR Sinemet QID 1 tab four x daily but no other changes for meantime  · Informed LORRAINE Vega Must that with slight increase in IR Sinemet, if patient develops Dyskinesia she needs to let me know right away and dose will be reduced back to 1/2 tab    · OT/PT feel rehab/SNF would be best with patient's significant rigidity/severe PD  · Needs DBS settings re-adjusted. Post Discharge, caretaker to schedule appt w/OP neurologist which pt doesn't have to manage PD medications/dementia and make sooner appt with patient's movement disorder specialist (?? At [facility]) who manages DBS  Will Sign OFF  · Continue great care by collaborating care team and nursing staff. · Testing discussed with patient and/or family -- any questions answered.         On DVT Prophylaxis yes no   Continue lovenox while inpatient x      Care Plan discussed with:  Patient deloris Moreno-caretaker [phone] cell and home [phone]      RN: x   Care Manager    Consultant/Specialist:         My collaborating care team physician may have further recommendations.   Patient will be discussed with Dr. Marcelo Aguilart     ===========================================================  ---PAUL Cervantes  ______________________________________________________________    ADDENDUM--> Collaborating Care Team Physician:

## 2017-10-05 NOTE — PROGRESS NOTES
Bedside and Verbal shift change report given to roxi duong  (oncoming nurse) by Jeff Tripathi  (offgoing nurse). Report included the following information SBAR and Kardex.

## 2017-10-05 NOTE — PROGRESS NOTES
[facility] Neurology  [facility] [address] [phone]  Carnella Aase  Inpatient Neurology Progress  Mariel Westfall, NISREEN-BC  Name: Tom Lee     : 1932   MRN: [id number]   Date: 10/4/2017       Addendum to progress note yesterday from conversation with Caregiver, Peter Nurse:    · She states patient sees a movement disorder specialist once yearly at the [facility] for mgmt/eval of pt Deep Brain stimulator but patient doesn't see regular neurologist for medication management  · Patient is prescribed his Sinemet from his PCP  · There seems to be a great decline from what is seen in the hospital from what it sounds like patient is able to do at home related to his:  Going to Buddhism 2x weekly using cane, walking to table to eat with walker at home, feeding himself   · Peter Lobo caregiver stated if she talks sternly to him at home he normally will try harder and be able to do some things it appeared like he couldn't at first    -----[address] [place] [address], [facility]

## 2017-10-05 NOTE — PROGRESS NOTES
José Antonio Walker [facility] [address]  [address] [facility], [address] [place] [address]  [phone]      Medical Progress Note      NAME: Yohannes Ho   :  1932  MRM:  [id number]    Date/Time: 10/5/2017           Subjective:     Chief Complaint:  i'm ok    No acute events. More responsive today. Denies cp, sob, abd pain          Objective:       Vitals:          Last 24hrs VS reviewed since prior progress note. Most recent are:    Visit Vitals    /76 (BP 1 Location: Right arm, BP Patient Position: At rest)    Pulse 77    Temp 97.9 °F (36.6 °C)    Resp 20    Ht 6' (1.829 m)    Wt 79.4 kg (175 lb)    SpO2 93%    BMI 23.73 kg/m2     SpO2 Readings from Last 6 Encounters:   10/05/17 93%            Intake/Output Summary (Last 24 hours) at 10/05/17 1353  Last data filed at 10/05/17 1002   Gross per 24 hour   Intake              320 ml   Output             1750 ml   Net            -1430 ml          Exam:     Physical Exam:    General:  Weak elderly male, uncomfortable, coughing but not in distress   Eyes: Pink conjunctivae, PERRLA with no discharge. Normal eye movements  Respiratory:  No accessory muscle use, clear breath sounds without crackles or wheezes  Cardiovascular:  No JVD or murmurs, regular and normal S1, S2 without thrills, bruits or peripheral edema. GI & :  Soft abdomen, non-tender, non-distended, normoactive bowel sounds with no palpable organomegaly  Heme:  No cervical or axillary adenopathy. Musculoskeletal:  No cyanosis, clubbing, atrophy or deformities  Skin:  No rashes, bruising or ulcers   Neurological: Awake and alert, speech is slow. Decreased neck movement but non tender. No facial droop. Appears hypertonic.  Limited exam.   Psychiatric:  Has no insight to illness      Medications Reviewed: (see below)    Lab Data Reviewed: (see below)    ______________________________________________________________________    Medications:     Current Facility-Administered Medications Medication Dose Route Frequency    carbidopa-levodopa ER (SINEMET CR)  mg per tablet 1 Tab  1 Tab Oral QID    carbidopa-levodopa (SINEMET)  mg per tablet 0.5 Tab  0.5 Tab Oral QID    azithromycin (ZITHROMAX) tablet 250 mg  250 mg Oral QPM    sodium chloride (NS) flush 5-10 mL  5-10 mL IntraVENous Q8H    sodium chloride (NS) flush 5-10 mL  5-10 mL IntraVENous PRN    acetaminophen (TYLENOL) tablet 650 mg  650 mg Oral Q4H PRN    morphine injection 1 mg  1 mg IntraVENous Q4H PRN    naloxone (NARCAN) injection 0.4 mg  0.4 mg IntraVENous PRN    prochlorperazine (COMPAZINE) injection 5 mg  5 mg IntraVENous Q8H PRN    enoxaparin (LOVENOX) injection 40 mg  40 mg SubCUTAneous Q24H    0.9% sodium chloride infusion  75 mL/hr IntraVENous CONTINUOUS    sodium chloride (NS) flush 5-10 mL  5-10 mL IntraVENous PRN            Lab Review:     Recent Labs      10/03/17   0259  10/02/17   2024   WBC  4.4  5.5   HGB  11.8*  15.5   HCT  36.1*  44.0   PLT  146*  186     Recent Labs      10/03/17   0259  10/02/17   2024   NA  143  139   K  4.4  4.8   CL  110*  103   CO2  23  29   GLU  81  100   BUN  23*  26*   CREA  0.64*  0.97   CA  7.4*  9.3   ALB  2.6*  3.7   SGOT  183*  193*   ALT  58  70     No components found for: GLPOC         Assessment / Plan:     Acute encephalopathy): unclear etiology. Worsening dementia vs Parkinson's disease vs acute infection. Appears improved today; suggesting dehydration as possible cause. UA ok, CXR normal. CT head unremarkable. Consult neurology     Acute bronchitis:Stop IV levaquin. Complete 5 day course of azithromycin for bronchitis. ? Dysphagia; ?aspiration. Consult speech therapy     ? Dementia without behavioral disturbance (10/3/2017): unclear if he is taking any home medications.  Consult palliative      Physical debility (10/3/2017): consult PT, OT    Parkinsons disease: sinemet is being adjusted    Total time spent with patient: [facility] discussed with: Patient, Care Manager and >50% of time spent in counseling and coordination of care    Discussed:  Care Plan    Prophylaxis:  Lovenox    Disposition:   PT, OT, RN           ___________________________________________________    Attending Physician: Mahad Vidal MD

## 2017-10-06 PROCEDURE — 92526 ORAL FUNCTION THERAPY: CPT

## 2017-10-06 PROCEDURE — 74011250637 HC RX REV CODE- 250/637: Performed by: INTERNAL MEDICINE

## 2017-10-06 PROCEDURE — 97110 THERAPEUTIC EXERCISES: CPT

## 2017-10-06 PROCEDURE — 74011250637 HC RX REV CODE- 250/637: Performed by: NURSE PRACTITIONER

## 2017-10-06 PROCEDURE — 74011250636 HC RX REV CODE- 250/636: Performed by: INTERNAL MEDICINE

## 2017-10-06 PROCEDURE — 65660000000 HC RM CCU STEPDOWN

## 2017-10-06 RX ADMIN — CARBIDOPA AND LEVODOPA 1 TABLET: 25; 100 TABLET, EXTENDED RELEASE ORAL at 11:09

## 2017-10-06 RX ADMIN — AZITHROMYCIN 250 MG: 250 TABLET, FILM COATED ORAL at 18:21

## 2017-10-06 RX ADMIN — Medication 10 ML: at 21:22

## 2017-10-06 RX ADMIN — SODIUM CHLORIDE 75 ML/HR: 900 INJECTION, SOLUTION INTRAVENOUS at 16:13

## 2017-10-06 RX ADMIN — CARBIDOPA AND LEVODOPA 1 TABLET: 25; 100 TABLET, EXTENDED RELEASE ORAL at 06:11

## 2017-10-06 RX ADMIN — Medication 10 ML: at 16:14

## 2017-10-06 RX ADMIN — CARBIDOPA AND LEVODOPA 1 TABLET: 25; 100 TABLET, EXTENDED RELEASE ORAL at 19:00

## 2017-10-06 RX ADMIN — ACETAMINOPHEN 650 MG: 325 TABLET ORAL at 21:23

## 2017-10-06 RX ADMIN — CARBIDOPA AND LEVODOPA 1 TABLET: 25; 100 TABLET, EXTENDED RELEASE ORAL at 16:13

## 2017-10-06 RX ADMIN — Medication 10 ML: at 06:12

## 2017-10-06 RX ADMIN — CARBIDOPA AND LEVODOPA 0.5 TABLET: 25; 100 TABLET ORAL at 15:30

## 2017-10-06 RX ADMIN — CARBIDOPA AND LEVODOPA 0.5 TABLET: 25; 100 TABLET ORAL at 11:09

## 2017-10-06 RX ADMIN — CARBIDOPA AND LEVODOPA 0.5 TABLET: 25; 100 TABLET ORAL at 21:22

## 2017-10-06 RX ADMIN — ENOXAPARIN SODIUM 40 MG: 40 INJECTION SUBCUTANEOUS at 21:24

## 2017-10-06 RX ADMIN — CARBIDOPA AND LEVODOPA 0.5 TABLET: 25; 100 TABLET ORAL at 06:12

## 2017-10-06 NOTE — PROGRESS NOTES
Palliative Medicine Consult  Johnnie: [phone] ([phone])    Patient Name: Kellie Shane  YOB: 1932    Date of Initial Consult: 10/3/17  Reason for Consult: Care decisions  Requesting Provider: Dr. Sid Prater   Primary Care Physician: Crys Luciano MD      SUMMARY:   Kellie Shane is a 80 y.o. with a past history of Parkinson, who was admitted on 10/2/2017 from home with a diagnosis of AMS. Current medical issues leading to Palliative Medicine involvement include: Care decisions. Chart reviewed/HPI-patient admitted to hospital because FTT/poor po intake over the last week or so. His primary caregiver(Tiffanie Rai) is not at bedside. PALLIATIVE DIAGNOSES:   1. GOC discussion  2. Parkinsons disease  3. FTT  4. Debility       PLAN:   1. Met with patient today and he is much more alert, awake. Working with Speech therapy. Able to tell me where he is and told me why he was here. 2. GOC-at this time, full restorative measures. 3. Have left messages for caregiver again today at both cell number and home phone. 4. AMD-patient tells me today he would want his sister(Lucretia) to be his MPOA. He did not know her phone number but we will attempt to obtain the number and complete AMD.   5. Code status-feel his sensorium is clearing. Will have code status discussion with him this afternoon and attempt to complete AMD .  6. Symptom management-no acute symptoms for us to manage at this time  7. Psychosocial-patient is cared for by Tiffanie(daughter in law of Carissa Hernandez). Her (Matt's son)  earlier this year of sepsis. He also had cared for patient. Patient retired from [employer] and worked in [employer]. 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 Seems to have good support from family. Has a sister(Lucretia) in [place] and brother in [place]. Both are elderly and no involved with his care but do check on patient via Matt  8. Initial consult note routed to primary continuity provider  9.  Communicated plan of care with: Palliative IDT, Dr. Hicks Knows / TREATMENT PREFERENCES:   [====Goals of Care====]  GOALS OF CARE:  Patient / health care proxy stated goals: continue all current care      TREATMENT PREFERENCES:   Code Status: Full Code    Advance Care Planning:  Advance Care Planning 10/3/2017   Patient's Healthcare Decision Maker is: Legal Next of Kin   Primary Decision Maker Name Sister and Brother are [facility]   Primary Decision Maker Relationship to Patient Sibling       Other:    The palliative care team has discussed with patient / health care proxy about goals of care / treatment preferences for patient.  [====Goals of Care====]         HISTORY:     History obtained from: chart, a little from patientMatt(cousin)    CHIEF COMPLAINT: confusion    HPI/SUBJECTIVE:    The patient is:   [x] Verbal and participatory  [] Non-participatory due to:   Patient much more awake.  Working with speech therapy    Clinical Pain Assessment (nonverbal scale for severity on nonverbal patients):   [++++ Clinical Pain Assessment++++]  [++++Pain Severity++++]: Pain: 0  [++++Pain Character++++]:   [++++Pain Duration++++]:   [++++Pain Effect++++]: denies pain  [++++Pain Factors++++]:   [++++Pain Frequency++++]:   [++++Pain Location++++]:   [++++ Clinical Pain Assessment++++]  Duration: for how long has pt been experiencing pain (e.g., 2 days, 1 month, years)  Frequency: how often pain is an issue (e.g., several times per day, once every few days, constant)     FUNCTIONAL ASSESSMENT:     Palliative Performance Scale (PPS):  PPS: 50       PSYCHOSOCIAL/SPIRITUAL SCREENING:     Advance Care Planning:  Advance Care Planning 10/3/2017   Patient's Healthcare Decision Maker is: Legal Next of Kin   Primary Decision Maker Name Sister and Brother are [facility]   Primary Decision Maker Relationship to Patient Sibling        Any spiritual / Episcopal concerns:  [] Yes /  [x] No    Caregiver Burnout:  [] Yes /  [x] No /  [] No Caregiver Present Anticipatory grief assessment:   [x] Normal  / [] Maladaptive       ESAS Anxiety:  can not assess    ESAS Depression:   can not assess       REVIEW OF SYSTEMS:     Positive and pertinent negative findings in ROS are noted above in HPI. The following systems were [] reviewed / [x] unable to be reviewed as noted in HPI  Other findings are noted below. Systems: constitutional, ears/nose/mouth/throat, respiratory, gastrointestinal, genitourinary, musculoskeletal, integumentary, neurologic, psychiatric, endocrine. Positive findings noted below. Modified ESAS Completed by: provider   Fatigue: 4 Drowsiness: 4     Pain: 0     Nausea: 0   Anorexia: 1 Dyspnea: 1     Constipation: No     Stool Occurrence(s): 1        PHYSICAL EXAM:     From RN flowsheet:  Wt Readings from Last 3 Encounters:   10/02/17 175 lb (79.4 kg)     Blood pressure 121/75, pulse 71, temperature 97.8 °F (36.6 °C), resp. rate 16, height 6' (1.829 m), weight 175 lb (79.4 kg), SpO2 96 %.     Pain Scale 1: Numeric (0 - 10)  Pain Intensity 1: 0                 Last bowel movement, if known:     Constitutional: thin, tired appearing but alert to person, place, drinking coffee with speech therapy and rates it \"19/20\"  Eyes: pupils equal, anicteric  ENMT: no nasal discharge, moist mucous membranes  Cardiovascular: regular rhythm, distal pulses intact  Respiratory: breathing slightly labored, symmetric  Gastrointestinal: soft non-tender, +bowel sounds  Musculoskeletal: no deformity, no tenderness to palpation  Skin: warm, dry  Neurologic: following commands, moving all extremities,      HISTORY:     Principal Problem:    Acute encephalopathy (10/2/2017)    Active Problems:    Acute bronchitis (10/3/2017)      Physical debility (10/3/2017)      Dementia without behavioral disturbance (10/3/2017)      Parkinson's disease ([facility] [address] (10/3/2017)      Overview: - deep brain stimulator-- followed by I[address]      S/P deep brain stimulator placement (10/3/2017)      Overview: - for parkinson's disease      Past Medical History:   Diagnosis Date    Dementia     Parkinson's disease ([address] 10/3/2017    - deep brain stimulator-- followed by Adeola Daniels S/P deep brain stimulator placement 10/3/2017    - for parkinson's disease      No past surgical history on file. No family history on file. History reviewed, no pertinent family history.   Social History   Substance Use Topics    Smoking status: Unknown If Ever Smoked    Smokeless tobacco: Not on file    Alcohol use Not on file     No Known Allergies   Current Facility-Administered Medications   Medication Dose Route Frequency    carbidopa-levodopa ER (SINEMET CR)  mg per tablet 1 Tab  1 Tab Oral QID    carbidopa-levodopa (SINEMET)  mg per tablet 0.5 Tab  0.5 Tab Oral QID    azithromycin (ZITHROMAX) tablet 250 mg  250 mg Oral QPM    sodium chloride (NS) flush 5-10 mL  5-10 mL IntraVENous Q8H    sodium chloride (NS) flush 5-10 mL  5-10 mL IntraVENous PRN    acetaminophen (TYLENOL) tablet 650 mg  650 mg Oral Q4H PRN    morphine injection 1 mg  1 mg IntraVENous Q4H PRN    naloxone (NARCAN) injection 0.4 mg  0.4 mg IntraVENous PRN    prochlorperazine (COMPAZINE) injection 5 mg  5 mg IntraVENous Q8H PRN    enoxaparin (LOVENOX) injection 40 mg  40 mg SubCUTAneous Q24H    0.9% sodium chloride infusion  75 mL/hr IntraVENous CONTINUOUS    sodium chloride (NS) flush 5-10 mL  5-10 mL IntraVENous PRN          LAB AND IMAGING FINDINGS:     Lab Results   Component Value Date/Time    WBC 4.4 10/03/2017 02:59 AM    HGB 11.8 10/03/2017 02:59 AM    PLATELET 731 73/28/4985 02:59 AM     Lab Results   Component Value Date/Time    Sodium 143 10/03/2017 02:59 AM    Potassium 4.4 10/03/2017 02:59 AM    Chloride 110 10/03/2017 02:59 AM    CO2 23 10/03/2017 02:59 AM    BUN 23 10/03/2017 02:59 AM    Creatinine 0.64 10/03/2017 02:59 AM    Calcium 7.4 10/03/2017 02:59 AM      Lab Results   Component Value Date/Time    AST (SGOT) 183 10/03/2017 02:59 AM    Alk. phosphatase 54 10/03/2017 02:59 AM    Protein, total 5.7 10/03/2017 02:59 AM    Albumin 2.6 10/03/2017 02:59 AM    Globulin 3.1 10/03/2017 02:59 AM     No results found for: INR, PTMR, PTP, PT1, PT2, APTT   No results found for: IRON, FE, TIBC, IBCT, PSAT, FERR   No results found for: PH, PCO2, PO2  No components found for: GLPOC   No results found for: CPK, CKMB             Total time: 35  Counseling / coordination time, spent as noted above: 25  > 50% counseling / coordination?: yes    Prolonged service was provided for  []30 min   []75 min in face to face time in the presence of the patient, spent as noted above. Time Start:   Time End:   Note: this can only be billed with 46577 (initial) or 41991 (follow up). If multiple start / stop times, list each separately.

## 2017-10-06 NOTE — PROGRESS NOTES
Problem: Patient Education: Go to Patient Education Activity  Goal: Patient/Family Education  PHYSICAL THERAPY TREATMENT  Patient: Jerri White [de-identified]80 y.o. male)  Date: 10/6/2017  Diagnosis: Acute encephalopathy Acute encephalopathy       Precautions:    Chart, physical therapy assessment, plan of care and goals were reviewed. ASSESSMENT:  Pt up in bed with trunk at 30 degrees. Pts speech hard to understand but seems agreeable to UE/LE ROM exercise. Pt performed AAROM to LE with mod assist and cues. (heel slides hip abd/add). Pt performed active assistive shoulder and elbow flexion and extension with min to mod assist and cues. Pts heels were floated. Continue goals. Progression toward goals:  [ ]      Improving appropriately and progressing toward goals  [ ]      Improving slowly and progressing toward goals  [ ]      Not making progress toward goals and plan of care will be adjusted       PLAN:  Patient continues to benefit from skilled intervention to address the above impairments. Continue treatment per established plan of care. Discharge Recommendations:  Viktor Haro  Further Equipment Recommendations for Discharge:         SUBJECTIVE:          OBJECTIVE DATA SUMMARY:   Critical Behavior:  Neurologic State: Alert, Confused  Orientation Level: Oriented to person, Oriented to place  Cognition: Memory loss  Safety/Judgement: Decreased insight into deficits  *  Therapeutic Exercises:   AAROM to UE/LE   Pain:  Pain Scale 1: Numeric (0 - 10)  Pain Intensity 1: 0              Activity Tolerance:   Pt tolerated treatment fairly well. Please refer to the flowsheet for vital signs taken during this treatment.   After treatment:   [ ] Patient left in no apparent distress sitting up in chair  [X] Patient left in no apparent distress in bed  [ ] Call bell left within reach  [ ] Nursing notified  [ ] Caregiver present  [ ] Bed alarm activated      COMMUNICATION/COLLABORATION:   The patients plan of care was discussed with: Physical Therapist     Genia Waite PTA   Time Calculation: 20 mins

## 2017-10-06 NOTE — PROGRESS NOTES
10/6/2017   CARE MANAGEMENT NOTE:  CM spoke with pt's family member who firmed up choices for rehab as follows:  1. [address] [address];  2. [facility];  3. [facility]. Referrals have been and await response re: acceptance and bed available.   Jonnathan

## 2017-10-06 NOTE — PROGRESS NOTES
José Antonio Walker [facility] [place] [address]  [address], [place], [address] [facility]  [phone]      Medical Progress Note      NAME: Santiago Willard   :  1932  MRM:  [id number]    Date/Time: 10/6/2017           Subjective:     Chief Complaint:  i'm ok    No acute events. Denies cp, sob, abd pain          Objective:       Vitals:          Last 24hrs VS reviewed since prior progress note. Most recent are:    Visit Vitals    BP 96/56 (BP 1 Location: Left arm, BP Patient Position: At rest)    Pulse 78    Temp 97.6 °F (36.4 °C)    Resp 18    Ht 6' (1.829 m)    Wt 79.4 kg (175 lb)    SpO2 93%    BMI 23.73 kg/m2     SpO2 Readings from Last 6 Encounters:   10/06/17 93%            Intake/Output Summary (Last 24 hours) at 10/06/17 0951  Last data filed at 10/06/17 0452   Gross per 24 hour   Intake              240 ml   Output             2700 ml   Net            -2460 ml          Exam:     Physical Exam:    General:  Weak elderly male, uncomfortable, coughing but not in distress   Eyes: Pink conjunctivae, PERRLA with no discharge. Normal eye movements  Respiratory:  No accessory muscle use, clear breath sounds without crackles or wheezes  Cardiovascular:  No JVD or murmurs, regular and normal S1, S2 without thrills, bruits or peripheral edema. GI & :  Soft abdomen, non-tender, non-distended, normoactive bowel sounds with no palpable organomegaly  Heme:  No cervical or axillary adenopathy. Musculoskeletal:  No cyanosis, clubbing, atrophy or deformities  Skin:  No rashes, bruising or ulcers   Neurological: Awake and alert, speech is slow. Decreased neck movement but non tender. No facial droop. Appears hypertonic.  Limited exam.   Psychiatric:  Has no insight to illness      Medications Reviewed: (see below)    Lab Data Reviewed: (see below)    ______________________________________________________________________    Medications:     Current Facility-Administered Medications   Medication Dose Route Frequency    carbidopa-levodopa ER (SINEMET CR)  mg per tablet 1 Tab  1 Tab Oral QID    carbidopa-levodopa (SINEMET)  mg per tablet 0.5 Tab  0.5 Tab Oral QID    azithromycin (ZITHROMAX) tablet 250 mg  250 mg Oral QPM    sodium chloride (NS) flush 5-10 mL  5-10 mL IntraVENous Q8H    sodium chloride (NS) flush 5-10 mL  5-10 mL IntraVENous PRN    acetaminophen (TYLENOL) tablet 650 mg  650 mg Oral Q4H PRN    morphine injection 1 mg  1 mg IntraVENous Q4H PRN    naloxone (NARCAN) injection 0.4 mg  0.4 mg IntraVENous PRN    prochlorperazine (COMPAZINE) injection 5 mg  5 mg IntraVENous Q8H PRN    enoxaparin (LOVENOX) injection 40 mg  40 mg SubCUTAneous Q24H    0.9% sodium chloride infusion  75 mL/hr IntraVENous CONTINUOUS    sodium chloride (NS) flush 5-10 mL  5-10 mL IntraVENous PRN            Lab Review:     No results for input(s): WBC, HGB, HCT, PLT, HGBEXT, HCTEXT, PLTEXT, HGBEXT, HCTEXT, PLTEXT in the last 72 hours. No results for input(s): NA, K, CL, CO2, GLU, BUN, CREA, CA, MG, PHOS, ALB, TBIL, SGOT, ALT, INR in the last 72 hours. No lab exists for component: INREXT, INREXT  No components found for: [place]         Assessment / Plan:     Acute encephalopathy): unclear etiology. Worsening dementia vs Parkinson's disease vs acute infection. Appears improved today; suggesting dehydration as possible cause. UA ok, CXR normal. CT head unremarkable. Consult neurology     Acute bronchitis:Stop IV levaquin. Complete 5 day course of azithromycin for bronchitis. ? Dysphagia; ?aspiration. Reviewed by speech therapy, cont [facility] soft with nectars     ? Dementia without behavioral disturbance (10/3/2017): unclear if he is taking any home medications.  Consult palliative      Physical debility (10/3/2017): consult PT, OT    Parkinsons disease: sinemet is being adjusted    Dispo: awaiting placement    Total time spent with patient: [address] [facility] discussed with: Patient, Care Manager and >50% of time spent in counseling and coordination of care    Discussed:  Care Plan    Prophylaxis:  Lovenox    Disposition:   PT, OT, RN           ___________________________________________________    Attending Physician: Arthur Quintero MD

## 2017-10-06 NOTE — PROGRESS NOTES
Problem: Dysphagia (Adult)  Goal: *Acute Goals and Plan of Care (Insert Text)  Swallowing goals initaited 10-4-17:  1) Tolerate mech soft, nectars without s/s aspiration by 10-7-17 ; continue to 10-10-17  [address] [address] TREATMENT  Patient: Yohannes Ho (10 y.o. male)  Date: 10/6/2017  Diagnosis: Acute encephalopathy Acute encephalopathy       Precautions: aspiration        ASSESSMENT:  Patient tolerating mech soft, nectars without s/s aspiration. He has mild-moderate oral dysphagia with oral holding, oral leakage, reduced bite. He has a good appetite; he eats and drinks well when fed. Moderate dysarthria noted with consonant imprecision, significantly affecting his speech intelligibility. Progression toward goals:  [ ]         Improving appropriately and progressing toward goals  [X]         Improving slowly and progressing toward goals  [ ]         Not making progress toward goals and plan of care will be adjusted       PLAN:  Recommendations and Planned Interventions:  Continue mech soft, nectars. Patient continues to benefit from skilled intervention to address the above impairments. Continue treatment per established plan of care. Discharge Recommendations:  Skilled Nursing Facility       SUBJECTIVE:   Patient stated I'd give the coffee a 19/20. OBJECTIVE:   Cognitive and Communication Status:  Neurologic State: Alert, Confused  Orientation Level: Oriented to person, Oriented to place, Disoriented to situation, Disoriented to time  Cognition: Memory loss  Perception: Appears intact  Perseveration: No perseveration noted  Safety/Judgement: Decreased insight into deficits  Dysphagia Treatment:  Oral Assessment:     P.O. Trials:  Patient Position: upright in bed  Vocal quality prior to P.O.:  (moderate dysasrthria with 60% speech itnelligiblity at sentence level with known context)  Consistency Presented: Nectar thick liquid; Solid  How Presented: SLP-fed/presented;Spoon   ORAL PHASE  Bolus Acceptance:  (decreased bite ability on banana)  Bolus Formation/Control: Impaired (slow chew. CNA reports oral holding bilaterally nad moderate oral leakage at meals)  Type of Impairment: Mastication;Drooling;Piecemeal  Propulsion: Delayed (# of seconds)  Oral Residue: Less than 10% of bolus (he eventually clears with liquid wash)   PHARYNGEAL PHASE:   Initiation of Swallow: Delayed (# of seconds) (mild-moderate)  Laryngeal Elevation: Weak (mild)  Aspiration Signs/Symptoms: None (on mech soft, NECTARS)                       Exercises:  Laryngeal Exercises:                                                                                                                                   Pain:  Pain Scale 1: Numeric (0 - 10)  Pain Intensity 1: 0     After treatment:   [ ]              Patient left in no apparent distress sitting up in chair  [X]              Patient left in no apparent distress in bed  [ ]              Call bell left within reach  [X]              Nursing notified  [ ]              Caregiver present  [ ]              Bed alarm activated      COMMUNICATION/EDUCATION:   Patient was educated regarding His deficit(s) of dysphagia, dysarthria as this relates to His diagnosis of PD. He demonstrated Fair understanding as evidenced by DISCUSSION. .     The patients plan of care including recommendations, planned interventions, and recommended diet changes were discussed with: Registered Nurse, , Certified Nursing Assistant/Patient Care Technician and Lupe Mixon.  [ ]              Posted safety precautions in patient's room.      JAMES Street  Time Calculation: 15 mins

## 2017-10-06 NOTE — PROGRESS NOTES
Bedside and Verbal shift change report given to Francisca Larose RN (oncoming nurse) by Marie Schofield RN (offgoing nurse). Report included the following information SBAR, Kardex, Intake/Output, MAR, Recent Results, Med Rec Status and Cardiac Rhythm paced.

## 2017-10-06 NOTE — PROGRESS NOTES
Palliative Medicine Social Work Note      SW made supportive visit to pt and cousin Leti Nieves at bedside. Dr. Yajaira Grant met with pt earlier today; pt had stated at that time he would want sister to serve as surrogate decision maker in the event he is unable to speak for himself. Leti Nieves hesitantly provided contact info for pt's sister Sanjay Acosta ([phone]), stated he has been speaking with her regularly, stated pt and family are all interested in rehab following hospitalization; facility choices were relayed to Care Manager. SW will continue to follow for support. Thank you for including Palliative Medicine in the care of Mr. Nicole Dave.     [address] [place] [address], [facility]  Palliative Medicine:  [phone])

## 2017-10-07 PROBLEM — R13.10 DYSPHAGIA: Status: ACTIVE | Noted: 2017-10-07

## 2017-10-07 PROBLEM — R62.7 FTT (FAILURE TO THRIVE) IN ADULT: Status: ACTIVE | Noted: 2017-10-07

## 2017-10-07 PROCEDURE — 74011250636 HC RX REV CODE- 250/636: Performed by: INTERNAL MEDICINE

## 2017-10-07 PROCEDURE — 77030011256 HC DRSG MEPILEX <16IN NO BORD MOLN -A

## 2017-10-07 PROCEDURE — 74011250637 HC RX REV CODE- 250/637: Performed by: INTERNAL MEDICINE

## 2017-10-07 PROCEDURE — 74011250637 HC RX REV CODE- 250/637: Performed by: NURSE PRACTITIONER

## 2017-10-07 PROCEDURE — 65660000000 HC RM CCU STEPDOWN

## 2017-10-07 RX ORDER — AZITHROMYCIN 250 MG/1
250 TABLET, FILM COATED ORAL DAILY
Status: DISCONTINUED | OUTPATIENT
Start: 2017-10-07 | End: 2017-10-08

## 2017-10-07 RX ORDER — CARBIDOPA AND LEVODOPA 25; 100 MG/1; MG/1
1.5 TABLET, EXTENDED RELEASE ORAL 4 TIMES DAILY
Qty: 180 TAB | Refills: 0 | Status: SHIPPED | OUTPATIENT
Start: 2017-10-07 | End: 2017-11-06

## 2017-10-07 RX ADMIN — AZITHROMYCIN 250 MG: 250 TABLET, FILM COATED ORAL at 10:07

## 2017-10-07 RX ADMIN — CARBIDOPA AND LEVODOPA 0.5 TABLET: 25; 100 TABLET ORAL at 06:45

## 2017-10-07 RX ADMIN — Medication 10 ML: at 06:47

## 2017-10-07 RX ADMIN — CARBIDOPA AND LEVODOPA 0.5 TABLET: 25; 100 TABLET ORAL at 21:31

## 2017-10-07 RX ADMIN — CARBIDOPA AND LEVODOPA 1 TABLET: 25; 100 TABLET, EXTENDED RELEASE ORAL at 14:35

## 2017-10-07 RX ADMIN — CARBIDOPA AND LEVODOPA 0.5 TABLET: 25; 100 TABLET ORAL at 14:35

## 2017-10-07 RX ADMIN — Medication 10 ML: at 14:35

## 2017-10-07 RX ADMIN — Medication 10 ML: at 21:33

## 2017-10-07 RX ADMIN — SODIUM CHLORIDE 75 ML/HR: 900 INJECTION, SOLUTION INTRAVENOUS at 04:54

## 2017-10-07 RX ADMIN — CARBIDOPA AND LEVODOPA 1 TABLET: 25; 100 TABLET, EXTENDED RELEASE ORAL at 19:00

## 2017-10-07 RX ADMIN — CARBIDOPA AND LEVODOPA 1 TABLET: 25; 100 TABLET, EXTENDED RELEASE ORAL at 07:00

## 2017-10-07 RX ADMIN — CARBIDOPA AND LEVODOPA 0.5 TABLET: 25; 100 TABLET ORAL at 10:06

## 2017-10-07 RX ADMIN — CARBIDOPA AND LEVODOPA 1 TABLET: 25; 100 TABLET, EXTENDED RELEASE ORAL at 10:06

## 2017-10-07 RX ADMIN — ENOXAPARIN SODIUM 40 MG: 40 INJECTION SUBCUTANEOUS at 21:32

## 2017-10-07 NOTE — PROGRESS NOTES
José Antonio Bakerelsen [facility] 79  [address] [facility], [place], [address] [place] [address]  [phone]      Medical Progress Note      NAME: Lisa Veronica   :  1932  MRM:  [id number]    Date/Time: 10/7/2017  11:31 AM       Assessment and Plan:     Acute encephalopathy) - POA, unclear etiology, unclear exact baseline. DDx dementia vs Parkinson's disease vs acute infection. Now improved. UA ok, CXR normal. CT head unremarkable. Consulted neurology, without firm Dx      Acute bronchitis - POA, mild, Complete 5 day course of azithromycin for bronchitis. No sign of PNA, though aspiration certainly likely    Severe protein deficient malnutrition - POA, appreciate speech consult. Supplements as tolerated. Palliative consulted. Dysphagia / Dysarthria - Risk of aspiration reviewed by speech therapy, continue mech soft with nectars      Dementia without behavioral disturbance - Not on home medications. Consulted palliative       Physical debility - Consulted PT, OT. Needs SNF, but given parkinson's and FTT, unclear prognosis.     Parkinsons disease - sinemet is being adjusted       Subjective:     Chief Complaint:  Weak, appears comfortable, words garbled as baseline. ROS:  (bold if positive, if negative)      Tolerating some PT  Tolerating some Diet        Objective:     Last 24hrs VS reviewed since prior progress note.  Most recent are:    Visit Vitals    /71 (BP 1 Location: Left arm, BP Patient Position: At rest)    Pulse 68    Temp 98.4 °F (36.9 °C)    Resp 20    Ht 6' (1.829 m)    Wt 79.4 kg (175 lb)    SpO2 96%    BMI 23.73 kg/m2     SpO2 Readings from Last 6 Encounters:   10/07/17 96%          Intake/Output Summary (Last 24 hours) at 10/07/17 1131  Last data filed at 10/07/17 0640   Gross per 24 hour   Intake            352.5 ml   Output             1100 ml   Net           -747.5 ml        Physical Exam:    Gen:  Frail, cachectic, in no acute distress  HEENT:  Pink conjunctivae, PERRL, hearing intact to voice, moist mucous membranes  Neck:  Supple, without masses, thyroid non-tender  Resp:  No accessory muscle use, clear breath sounds without wheezes rales or rhonchi  Card:  No murmurs, normal S1, S2 without thrills, bruits or peripheral edema  Abd:  Soft, non-tender, non-distended, normoactive bowel sounds are present, no mass  Lymph:  No cervical or inguinal adenopathy  Musc:  No cyanosis or clubbing  Skin:  No rashes or ulcers, skin turgor is reduced  Neuro:  Cranial nerves are grossly intact, general motor weakness, follows commands vaguely  Psych:  Unclear insight, oriented to person, place probably    Telemetry reviewed:   normal sinus rhythm  __________________________________________________________________  Medications Reviewed: (see below)  Medications:     Current Facility-Administered Medications   Medication Dose Route Frequency    azithromycin (ZITHROMAX) tablet 250 mg  250 mg Oral DAILY    carbidopa-levodopa ER (SINEMET CR)  mg per tablet 1 Tab  1 Tab Oral QID    carbidopa-levodopa (SINEMET)  mg per tablet 0.5 Tab  0.5 Tab Oral QID    sodium chloride (NS) flush 5-10 mL  5-10 mL IntraVENous Q8H    sodium chloride (NS) flush 5-10 mL  5-10 mL IntraVENous PRN    acetaminophen (TYLENOL) tablet 650 mg  650 mg Oral Q4H PRN    morphine injection 1 mg  1 mg IntraVENous Q4H PRN    naloxone (NARCAN) injection 0.4 mg  0.4 mg IntraVENous PRN    prochlorperazine (COMPAZINE) injection 5 mg  5 mg IntraVENous Q8H PRN    enoxaparin (LOVENOX) injection 40 mg  40 mg SubCUTAneous Q24H    0.9% sodium chloride infusion  75 mL/hr IntraVENous CONTINUOUS    sodium chloride (NS) flush 5-10 mL  5-10 mL IntraVENous PRN        Lab Data Reviewed: (see below)  Lab Review:     No results for input(s): WBC, HGB, HCT, PLT, HGBEXT, HCTEXT, PLTEXT in the last 72 hours.   No results for input(s): NA, K, CL, CO2, GLU, BUN, CREA, CA, MG, PHOS, ALB, TBIL, TBILI, SGOT, ALT, INR in the last 72 hours.    No lab exists for component: INREXT  No results found for: GLUCPOC  No results for input(s): PH, PCO2, PO2, HCO3, FIO2 in the last 72 hours. No results for input(s): INR in the last 72 hours. No lab exists for component: INREXT  All Micro Results     Procedure Component Value Units Date/Time    CULTURE, BLOOD [[id number]] Collected:  10/02/17 2024    Order Status:  Completed Specimen:  Blood from Blood Updated:  10/07/17 1010     Special Requests: NO SPECIAL REQUESTS        Culture result: NO GROWTH 5 DAYS       CULTURE, BLOOD [[id number]] Collected:  10/02/17 2024    Order Status:  Completed Specimen:  Blood from Blood Updated:  10/07/17 1010     Special Requests: NO SPECIAL REQUESTS        Culture result: NO GROWTH 5 DAYS             I have reviewed notes of prior 24hr.     Other pertinent lab: none    Total time spent with patient: [address] [place] [address] discussed with: Patient, Care Manager, Nursing Staff and >50% of time spent in counseling and coordination of care    Discussed:  Care Plan and D/C Planning    Prophylaxis:  H2B/PPI    Disposition:  SNF/LTC           ___________________________________________________    Attending Physician: Coreen Cortez MD

## 2017-10-07 NOTE — PROGRESS NOTES
Bedside and Verbal shift change report given to TAMIKO Euceda  (oncoming nurse) by Demetrius Hurtado  (offgoing nurse). Report included the following information SBAR and Kardex.

## 2017-10-07 NOTE — PROGRESS NOTES
Bedside and Verbal shift change report given to ibis duong  (oncoming nurse) by Anjali Iglesias  (offgoing nurse). Report included the following information SBAR and Kardex.

## 2017-10-07 NOTE — PROGRESS NOTES
Problem: Falls - Risk of  Goal: *Absence of Falls  Document Lucero Fall Risk and appropriate interventions in the flowsheet.    Outcome: Progressing Towards Goal  Fall Risk Interventions:  Mobility Interventions: Bed/chair exit alarm     Mentation Interventions: Bed/chair exit alarm, Door open when patient unattended, Room close to nurse's station, Reorient patient, More frequent rounding, Increase mobility, Adequate sleep, hydration, pain control     Medication Interventions: Bed/chair exit alarm     Elimination Interventions: Bed/chair exit alarm     History of Falls Interventions: Bed/chair exit alarm, Door open when patient unattended, Room close to nurse's station

## 2017-10-07 NOTE — DISCHARGE INSTRUCTIONS
Patient Discharge Instructions    Lore Lynch / [id number] : 1932    Admitted 10/2/2017 Discharged: 10/7/2017     Primary Diagnoses  Problem List as of 10/7/2017  Date Reviewed: 10/3/2017          Codes Class Noted - Resolved   FTT (failure to thrive) in adult   Dysphagia   Acute bronchitis   Physical debility   Dementia without behavioral disturbance   Parkinson's disease ([facility] [address] (Chronic)   S/P deep brain stimulator placement (Chronic)   * (Principal)Acute encephalopathy          Take Home Medications     · It is important that you take the medication exactly as they are prescribed. · Keep your medication in the bottles provided by the pharmacist and keep a list of the medication names, dosages, and times to be taken in your wallet. · Do not take other medications without consulting your doctor. What to do at Home    Recommended diet: Dysphagia diet-pureed, Nectar thick liquids and Resume previous diet    Recommended activity: Activity as tolerated and PT/OT Eval and Treat    If you experience worse symptoms, please follow up with your PCP. Follow-up with your PCP in a few weeks        Information obtained by :  I understand that if any problems occur once I am at home I am to contact my physician. I understand and acknowledge receipt of the instructions indicated above.                                                                                                                                            Physician's or R.N.'s Signature                                                                  Date/Time                                                                                                                                              Patient or Representative Signature                                                          Date/Time

## 2017-10-07 NOTE — PROGRESS NOTES
Bedside and Verbal shift change report given to Will RN (oncoming nurse) by Elliott Almendarez RN (offgoing nurse). Report included the following information SBAR, Kardex, Procedure Summary, Intake/Output, MAR and Recent Results.

## 2017-10-07 NOTE — PROGRESS NOTES
Bedside and Verbal shift change report given to Farshad gudino RN (oncoming nurse) by Jordon Caraballo RN (offgoing nurse). Report included the following information SBAR and Kardex.

## 2017-10-07 NOTE — DISCHARGE SUMMARY
Physician Discharge Summary     Patient ID:  Kellie Meckel  [id number]  80 y.o.  2/29/1932    Admit date: 10/2/2017    Discharge date and time: 10/10/2017    Admission Diagnoses: Acute encephalopathy    Discharge Diagnoses:    Principal Diagnosis   Acute encephalopathy                                             Other Diagnoses    Acute bronchitis (10/3/2017)    Physical debility (10/3/2017)    Dementia without behavioral disturbance (10/3/2017)    Parkinson's disease ([address] (10/3/2017)    S/P deep brain stimulator placement (10/3/2017)    FTT (failure to thrive) in adult (10/7/2017)    Dysphagia (10/7/2017)    Hospital Course:   Severe protein deficient malnutrition - POA, appreciate speech consult. Supplements as tolerated. Palliative consulted. This is end stage Parkingson disease, and he will die of it's complications in the near future.     Parkinsons disease / Dysphagia / Dysarthria - Risk of aspiration reviewed by speech therapy, continue mech soft with nectar. Continue new Sinemet dose      Dementia without behavioral disturbance - Not on home medications. Now close to baseline. Consulted palliative.      Physical debility - Consulted PT/OT. Needs SNF, to deal with effects of parkinson's and FTT. I talked to family about likelihood of progression to full LTC, and death, in future      Hypotension - Likely his baseline. Not on any meds to cause this. Bolus NS one time. This will be a recurrent issue.     Acute encephalopathy - POA, unclear etiology, now back to baseline. DDx dementia vs Parkinson's disease vs acute infection. UA ok, CXR normal. CT head unremarkable. Consulted neurology, without firm Dx      Acute bronchitis - POA, mild, and just as likely was an aspiration event.   Completed 5 day course of azithromycin for bronchitis.  No sign of PNA, though aspiration certainly likely     PCP: Crys Luciano, MD    Consults: Neurology    Significant Diagnostic Studies: See Hospital Course    Discharged to SNF in improved condition. Discharge Exam:   BP (!) 88/55 (BP 1 Location: Right arm, BP Patient Position: During activity)    Pulse 80    Temp 98.2 °F (36.8 °C)    Resp 20    Ht 6' (1.829 m)    Wt 79.4 kg (175 lb)    SpO2 92%    BMI 23.73 kg/m2      Gen:  Frail, cachectic, in no acute distress  HEENT:  Pink conjunctivae, PERRL, hearing intact to voice, moist mucous membranes  Neck:  Supple, without masses, thyroid non-tender  Resp:  No accessory muscle use, clear breath sounds without wheezes rales or rhonchi  Card:  No murmurs, normal S1, S2 without thrills, bruits or peripheral edema  Abd:  Soft, non-tender, non-distended, normoactive bowel sounds are present, no mass  Lymph:  No cervical or inguinal adenopathy  Musc:  No cyanosis or clubbing  Skin:  No rashes or ulcers, skin turgor is reduced  Neuro:  Cranial nerves are grossly intact, general motor weakness, follows commands vaguely  Psych:  Unclear insight, oriented to person, place probably    Patient Instructions:   Current Discharge Medication List      CONTINUE these medications which have CHANGED    Details   carbidopa-levodopa ER (SINEMET CR)  mg per tablet Take 1.5 Tabs by mouth four (4) times daily for 30 days. The patient takes his medication at 7am, 11am, 3pm, and 7pm  Qty: 180 Tab, Refills: 0         CONTINUE these medications which have NOT CHANGED    Details   therapeutic multivitamin (THERA) tablet Take 1 Tab by mouth daily. glucosamine (GLUCOSAMINE RELIEF) 1,000 mg tab Take 1 Tab by mouth daily. Activity: Activity as tolerated and PT/OT Eval and Treat  Diet: Dysphagia diet-pureed, Nectar thick liquids and Resume previous diet  Wound Care: None needed    Follow-up with your PCP in a few weeks.   Follow-up tests/labs - none    Signed:  Dannie Batres MD  10/10/2017  11:19 AM

## 2017-10-08 LAB
ANION GAP SERPL CALC-SCNC: 7 MMOL/L (ref 5–15)
BACTERIA SPEC CULT: NORMAL
BACTERIA SPEC CULT: NORMAL
BUN SERPL-MCNC: 13 MG/DL (ref 6–20)
BUN/CREAT SERPL: 18 (ref 12–20)
CALCIUM SERPL-MCNC: 8.9 MG/DL (ref 8.5–10.1)
CHLORIDE SERPL-SCNC: 102 MMOL/L (ref 97–108)
CO2 SERPL-SCNC: 28 MMOL/L (ref 21–32)
CREAT SERPL-MCNC: 0.71 MG/DL (ref 0.7–1.3)
ERYTHROCYTE [DISTWIDTH] IN BLOOD BY AUTOMATED COUNT: 13 % (ref 11.5–14.5)
GLUCOSE SERPL-MCNC: 96 MG/DL (ref 65–100)
HCT VFR BLD AUTO: 43.6 % (ref 36.6–50.3)
HGB BLD-MCNC: 14.8 G/DL (ref 12.1–17)
MAGNESIUM SERPL-MCNC: 1.9 MG/DL (ref 1.6–2.4)
MCH RBC QN AUTO: 31 PG (ref 26–34)
MCHC RBC AUTO-ENTMCNC: 33.9 G/DL (ref 30–36.5)
MCV RBC AUTO: 91.2 FL (ref 80–99)
PLATELET # BLD AUTO: 186 K/UL (ref 150–400)
POTASSIUM SERPL-SCNC: 4 MMOL/L (ref 3.5–5.1)
RBC # BLD AUTO: 4.78 M/UL (ref 4.1–5.7)
SERVICE CMNT-IMP: NORMAL
SERVICE CMNT-IMP: NORMAL
SODIUM SERPL-SCNC: 137 MMOL/L (ref 136–145)
WBC # BLD AUTO: 4.5 K/UL (ref 4.1–11.1)

## 2017-10-08 PROCEDURE — 65660000000 HC RM CCU STEPDOWN

## 2017-10-08 PROCEDURE — 83735 ASSAY OF MAGNESIUM: CPT | Performed by: INTERNAL MEDICINE

## 2017-10-08 PROCEDURE — 74011250637 HC RX REV CODE- 250/637: Performed by: INTERNAL MEDICINE

## 2017-10-08 PROCEDURE — 36415 COLL VENOUS BLD VENIPUNCTURE: CPT | Performed by: INTERNAL MEDICINE

## 2017-10-08 PROCEDURE — 80048 BASIC METABOLIC PNL TOTAL CA: CPT | Performed by: INTERNAL MEDICINE

## 2017-10-08 PROCEDURE — 74011250636 HC RX REV CODE- 250/636: Performed by: INTERNAL MEDICINE

## 2017-10-08 PROCEDURE — 85027 COMPLETE CBC AUTOMATED: CPT | Performed by: INTERNAL MEDICINE

## 2017-10-08 PROCEDURE — 74011250637 HC RX REV CODE- 250/637: Performed by: NURSE PRACTITIONER

## 2017-10-08 RX ADMIN — Medication 10 ML: at 08:47

## 2017-10-08 RX ADMIN — CARBIDOPA AND LEVODOPA 1 TABLET: 25; 100 TABLET, EXTENDED RELEASE ORAL at 15:32

## 2017-10-08 RX ADMIN — CARBIDOPA AND LEVODOPA 0.5 TABLET: 25; 100 TABLET ORAL at 15:32

## 2017-10-08 RX ADMIN — CARBIDOPA AND LEVODOPA 1 TABLET: 25; 100 TABLET, EXTENDED RELEASE ORAL at 07:00

## 2017-10-08 RX ADMIN — CARBIDOPA AND LEVODOPA 1 TABLET: 25; 100 TABLET, EXTENDED RELEASE ORAL at 11:51

## 2017-10-08 RX ADMIN — CARBIDOPA AND LEVODOPA 0.5 TABLET: 25; 100 TABLET ORAL at 08:45

## 2017-10-08 RX ADMIN — CARBIDOPA AND LEVODOPA 1 TABLET: 25; 100 TABLET, EXTENDED RELEASE ORAL at 19:00

## 2017-10-08 RX ADMIN — Medication 10 ML: at 13:49

## 2017-10-08 RX ADMIN — Medication 10 ML: at 22:59

## 2017-10-08 RX ADMIN — CARBIDOPA AND LEVODOPA 0.5 TABLET: 25; 100 TABLET ORAL at 18:15

## 2017-10-08 RX ADMIN — ENOXAPARIN SODIUM 40 MG: 40 INJECTION SUBCUTANEOUS at 22:58

## 2017-10-08 RX ADMIN — CARBIDOPA AND LEVODOPA 0.5 TABLET: 25; 100 TABLET ORAL at 11:50

## 2017-10-08 RX ADMIN — AZITHROMYCIN 250 MG: 250 TABLET, FILM COATED ORAL at 08:45

## 2017-10-08 RX ADMIN — Medication 10 ML: at 08:46

## 2017-10-08 NOTE — PROGRESS NOTES
José Antonio Walker [facility] [place] [address]  [address] [facility], [address] [place] [address]  [phone]      Medical Progress Note      NAME: Dilip Gonzalez   :  1932  MRM:  [id number]    Date/Time: 10/8/2017  11:31 AM       Assessment and Plan:     Acute encephalopathy - POA, unclear etiology, unclear exact baseline. DDx dementia vs Parkinson's disease vs acute infection. Now improved, though some waxing/waning. UA ok, CXR normal. CT head unremarkable. Consulted neurology, without firm Dx      Acute bronchitis - POA, mild, Completed 5 day course of azithromycin for bronchitis. No sign of PNA, though aspiration certainly likely    Severe protein deficient malnutrition - POA, appreciate speech consult. Supplements as tolerated. Palliative consulted. Dysphagia / Dysarthria - Risk of aspiration reviewed by speech therapy, continue mech soft with nectars      Dementia without behavioral disturbance - Not on home medications. Consulted palliative       Physical debility - Consulted PT, OT. Needs SNF, but given parkinson's and FTT, unclear prognosis. I talked to family about likelihood of progression to full care     Parkinsons disease - sinemet is being adjusted       Subjective:     Chief Complaint:  Weak, appears comfortable, words garbled as at baseline. ROS:  (bold if positive, if negative)      Tolerating some PT  Tolerating some Diet        Objective:     Last 24hrs VS reviewed since prior progress note.  Most recent are:    Visit Vitals    /86 (BP 1 Location: Right arm, BP Patient Position: At rest)    Pulse 85    Temp 98 °F (36.7 °C)    Resp 19    Ht 6' (1.829 m)    Wt 79.4 kg (175 lb)    SpO2 93%    BMI 23.73 kg/m2     SpO2 Readings from Last 6 Encounters:   10/08/17 93%            Intake/Output Summary (Last 24 hours) at 10/08/17 1154  Last data filed at 10/08/17 7265   Gross per 24 hour   Intake                0 ml   Output             2075 ml   Net            -2075 ml Physical Exam:    Gen:  Frail, cachectic, in no acute distress  HEENT:  Pink conjunctivae, PERRL, hearing intact to voice, moist mucous membranes  Neck:  Supple, without masses, thyroid non-tender  Resp:  No accessory muscle use, clear breath sounds without wheezes rales or rhonchi  Card:  No murmurs, normal S1, S2 without thrills, bruits or peripheral edema  Abd:  Soft, non-tender, non-distended, normoactive bowel sounds are present, no mass  Lymph:  No cervical or inguinal adenopathy  Musc:  No cyanosis or clubbing  Skin:  No rashes or ulcers, skin turgor is reduced  Neuro:  Cranial nerves are grossly intact, general motor weakness, follows commands vaguely  Psych:  Unclear insight, oriented to person, place probably    Telemetry reviewed:   normal sinus rhythm  __________________________________________________________________  Medications Reviewed: (see below)  Medications:     Current Facility-Administered Medications   Medication Dose Route Frequency    carbidopa-levodopa ER (SINEMET CR)  mg per tablet 1 Tab  1 Tab Oral QID    carbidopa-levodopa (SINEMET)  mg per tablet 0.5 Tab  0.5 Tab Oral QID    sodium chloride (NS) flush 5-10 mL  5-10 mL IntraVENous Q8H    sodium chloride (NS) flush 5-10 mL  5-10 mL IntraVENous PRN    acetaminophen (TYLENOL) tablet 650 mg  650 mg Oral Q4H PRN    morphine injection 1 mg  1 mg IntraVENous Q4H PRN    naloxone (NARCAN) injection 0.4 mg  0.4 mg IntraVENous PRN    prochlorperazine (COMPAZINE) injection 5 mg  5 mg IntraVENous Q8H PRN    enoxaparin (LOVENOX) injection 40 mg  40 mg SubCUTAneous Q24H    sodium chloride (NS) flush 5-10 mL  5-10 mL IntraVENous PRN        Lab Data Reviewed: (see below)  Lab Review:     No results for input(s): WBC, HGB, HCT, PLT, HGBEXT, HCTEXT, PLTEXT, HGBEXT, HCTEXT, PLTEXT in the last 72 hours.   No results for input(s): NA, K, CL, CO2, GLU, BUN, CREA, CA, MG, PHOS, ALB, TBIL, TBILI, SGOT, ALT, INR in the last 72 hours.    No lab exists for component: INREXT, INREXT  No results found for: GLUCPOC  No results for input(s): PH, PCO2, PO2, HCO3, FIO2 in the last 72 hours. No results for input(s): INR in the last 72 hours. No lab exists for component: Darrel Wheeler  All Micro Results     Procedure Component Value Units Date/Time    CULTURE, BLOOD [[id number]] Collected:  10/02/17 2024    Order Status:  Completed Specimen:  Blood from Blood Updated:  10/08/17 0840     Special Requests: NO SPECIAL REQUESTS        Culture result: NO GROWTH 6 DAYS       CULTURE, BLOOD [[id number]] Collected:  10/02/17 2024    Order Status:  Completed Specimen:  Blood from Blood Updated:  10/08/17 0840     Special Requests: NO SPECIAL REQUESTS        Culture result: NO GROWTH 6 DAYS             I have reviewed notes of prior 24hr.     Other pertinent lab: none    Total time spent with patient: [address] discussed with: Patient, Family, Nursing Staff and >50% of time spent in counseling and coordination of care  Had lengthy discussion with nephew about prognosis, LTC and discharge planning    Discussed:  Care Plan and D/C Planning    Prophylaxis:  H2B/PPI    Disposition:  SNF/LTC           ___________________________________________________    Attending Physician: Jazzmine Toledo MD

## 2017-10-08 NOTE — PROGRESS NOTES
Bedside shift change report given to Rubia López RN (oncoming nurse) by Ana M Demarco (offgoing nurse). Report included the following information SBAR, Kardex, OR Summary, Intake/Output and MAR.

## 2017-10-09 PROCEDURE — 77030012856

## 2017-10-09 PROCEDURE — 74011250637 HC RX REV CODE- 250/637: Performed by: NURSE PRACTITIONER

## 2017-10-09 PROCEDURE — 65660000000 HC RM CCU STEPDOWN

## 2017-10-09 PROCEDURE — 77030011256 HC DRSG MEPILEX <16IN NO BORD MOLN -A

## 2017-10-09 PROCEDURE — 74011250636 HC RX REV CODE- 250/636: Performed by: INTERNAL MEDICINE

## 2017-10-09 RX ADMIN — ENOXAPARIN SODIUM 40 MG: 40 INJECTION SUBCUTANEOUS at 20:29

## 2017-10-09 RX ADMIN — CARBIDOPA AND LEVODOPA 1 TABLET: 25; 100 TABLET, EXTENDED RELEASE ORAL at 15:00

## 2017-10-09 RX ADMIN — CARBIDOPA AND LEVODOPA 1 TABLET: 25; 100 TABLET, EXTENDED RELEASE ORAL at 07:00

## 2017-10-09 RX ADMIN — CARBIDOPA AND LEVODOPA 0.5 TABLET: 25; 100 TABLET ORAL at 18:35

## 2017-10-09 RX ADMIN — CARBIDOPA AND LEVODOPA 1 TABLET: 25; 100 TABLET, EXTENDED RELEASE ORAL at 19:00

## 2017-10-09 RX ADMIN — CARBIDOPA AND LEVODOPA 0.5 TABLET: 25; 100 TABLET ORAL at 07:03

## 2017-10-09 RX ADMIN — CARBIDOPA AND LEVODOPA 0.5 TABLET: 25; 100 TABLET ORAL at 14:32

## 2017-10-09 RX ADMIN — Medication 10 ML: at 22:17

## 2017-10-09 RX ADMIN — CARBIDOPA AND LEVODOPA 0.5 TABLET: 25; 100 TABLET ORAL at 10:43

## 2017-10-09 RX ADMIN — Medication 10 ML: at 13:06

## 2017-10-09 RX ADMIN — CARBIDOPA AND LEVODOPA 1 TABLET: 25; 100 TABLET, EXTENDED RELEASE ORAL at 11:00

## 2017-10-09 RX ADMIN — Medication 10 ML: at 05:34

## 2017-10-09 NOTE — PROGRESS NOTES
José Antonio Walker [facility] 79  [address] [facility], [address] [place] [address]  [phone]      Medical Progress Note      NAME: Joe Ariza   :  1932  MRM:  [id number]    Date/Time: 10/9/2017  11:31 AM       Assessment and Plan:     Severe protein deficient malnutrition - POA, appreciate speech consult. Supplements as tolerated. Palliative consulted. Parkinsons disease / Dysphagia / Dysarthria - Risk of aspiration reviewed by speech therapy, continue mech soft with nectar. Continue new Sinemet dose      Dementia without behavioral disturbance - Not on home medications. Now close to baseline. Consulted palliative       Physical debility - Consulted PT, OT. Needs SNF, to deal with effects of parkinson's and FTT. I talked to family about likelihood of progression to full LTC in future     Acute encephalopathy - POA, unclear etiology, now back to baseline. DDx dementia vs Parkinson's disease vs acute infection. UA ok, CXR normal. CT head unremarkable. Consulted neurology, without firm Dx      Acute bronchitis - POA, mild, Completed 5 day course of azithromycin for bronchitis. No sign of PNA, though aspiration certainly likely       Subjective:     Chief Complaint:  Weak, appears comfortable, words garbled, which is his baseline. ROS:  (bold if positive, if negative)      Tolerating some PT  Tolerating some Diet        Objective:     Last 24hrs VS reviewed since prior progress note.  Most recent are:    Visit Vitals    /78 (BP 1 Location: Left arm, BP Patient Position: At rest)    Pulse 76    Temp 97.6 °F (36.4 °C)    Resp 19    Ht 6' (1.829 m)    Wt 79.4 kg (175 lb)    SpO2 94%    BMI 23.73 kg/m2     SpO2 Readings from Last 6 Encounters:   10/09/17 94%            Intake/Output Summary (Last 24 hours) at 10/09/17 1048  Last data filed at 10/09/17 0552   Gross per 24 hour   Intake                0 ml   Output              600 ml   Net             -600 ml        Physical Exam:    Gen:  Frail, cachectic, in no acute distress  HEENT:  Pink conjunctivae, PERRL, hearing intact to voice, moist mucous membranes  Neck:  Supple, without masses, thyroid non-tender  Resp:  No accessory muscle use, clear breath sounds without wheezes rales or rhonchi  Card:  No murmurs, normal S1, S2 without thrills, bruits or peripheral edema  Abd:  Soft, non-tender, non-distended, normoactive bowel sounds are present, no mass  Lymph:  No cervical or inguinal adenopathy  Musc:  No cyanosis or clubbing  Skin:  No rashes or ulcers, skin turgor is reduced  Neuro:  Cranial nerves are grossly intact, general motor weakness, follows commands vaguely  Psych:  Unclear insight, oriented to person, place probably    Telemetry reviewed:   normal sinus rhythm  __________________________________________________________________  Medications Reviewed: (see below)  Medications:     Current Facility-Administered Medications   Medication Dose Route Frequency    carbidopa-levodopa ER (SINEMET CR)  mg per tablet 1 Tab  1 Tab Oral QID    carbidopa-levodopa (SINEMET)  mg per tablet 0.5 Tab  0.5 Tab Oral QID    sodium chloride (NS) flush 5-10 mL  5-10 mL IntraVENous Q8H    sodium chloride (NS) flush 5-10 mL  5-10 mL IntraVENous PRN    acetaminophen (TYLENOL) tablet 650 mg  650 mg Oral Q4H PRN    morphine injection 1 mg  1 mg IntraVENous Q4H PRN    naloxone (NARCAN) injection 0.4 mg  0.4 mg IntraVENous PRN    prochlorperazine (COMPAZINE) injection 5 mg  5 mg IntraVENous Q8H PRN    enoxaparin (LOVENOX) injection 40 mg  40 mg SubCUTAneous Q24H    sodium chloride (NS) flush 5-10 mL  5-10 mL IntraVENous PRN        Lab Data Reviewed: (see below)  Lab Review:     Recent Labs      10/08/17   1144   WBC  4.5   HGB  14.8   HCT  43.6   PLT  186     Recent Labs      10/08/17   1144   NA  137   K  4.0   CL  102   CO2  28   GLU  96   BUN  13   CREA  0.71   CA  8.9   MG  1.9     No results found for: GLUCPOC  No results for input(s): PH, PCO2, PO2, HCO3, FIO2 in the last 72 hours. No results for input(s): INR in the last 72 hours. No lab exists for component: Rikki Gong  All Micro Results     Procedure Component Value Units Date/Time    CULTURE, BLOOD [[id number]] Collected:  10/02/17 2024    Order Status:  Completed Specimen:  Blood from Blood Updated:  10/08/17 0840     Special Requests: NO SPECIAL REQUESTS        Culture result: NO GROWTH 6 DAYS       CULTURE, BLOOD [[id number]] Collected:  10/02/17 2024    Order Status:  Completed Specimen:  Blood from Blood Updated:  10/08/17 0840     Special Requests: NO SPECIAL REQUESTS        Culture result: NO GROWTH 6 DAYS             I have reviewed notes of prior 24hr.     Other pertinent lab: none    [facility] discussed with: Patient, Care Manager, Nursing Staff and >50% of time spent in counseling and coordination of care    Discussed:  Care Plan and D/C Planning    Prophylaxis:  H2B/PPI    Disposition:  SNF/LTC           ___________________________________________________    Attending Physician: Jovanni Buchanan MD

## 2017-10-09 NOTE — PROGRESS NOTES
Pharmacist Discharge Medication Reconciliation    Discharge Provider:  Dr. Mooney Certain       Discharge Medications:      Current Discharge Medication List        CONTINUE these medications which have CHANGED         Dose & Instructions Dispensing Information Comments   Morning Noon Evening Bedtime      carbidopa-levodopa ER  mg per tablet   Commonly known as:  SINEMET CR   What changed:  how much to take       Your last dose was: Your next dose is:              Dose:  1.5 Tab   Take 1.5 Tabs by mouth four (4) times daily for 30 days. The patient takes his medication at 7am, 11am, 3pm, and 7pm    Quantity:  180 Tab   Refills:  0                               CONTINUE these medications which have NOT CHANGED         Dose & Instructions Dispensing Information Comments   Morning Noon Evening Bedtime      GLUCOSAMINE RELIEF 1,000 mg Tab   Generic drug:  glucosamine       Your last dose was: Your next dose is:              Dose:  1 Tab   Take 1 Tab by mouth daily. Refills:  0                         THERA tablet   Generic drug:  therapeutic multivitamin       Your last dose was: Your next dose is:              Dose:  1 Tab   Take 1 Tab by mouth daily. Refills:  0                                  Where to Get Your Medications        Information on where to get these meds will be given to you by the nurse or doctor. !  Ask your nurse or doctor about these medications     carbidopa-levodopa ER  mg per tablet             The patient's chart, MAR, and AVS were reviewed by   Ollie RamosD  Contact: [phone]

## 2017-10-09 NOTE — PROGRESS NOTES
Nutrition Assessment:    RECOMMENDATIONS/INTERVENTION(S):   Continue NDD3 (mech soft chopped) w nectar liquids per SLP recs  Monitor PO intakes, wt, s/s aspiration  Add Ensure pudding for lunch/dinner, add mighty shake for breakfast - chocolate    ASSESSMENT:   10/9: Follow up. Pt drinking/eating ONS. Pt states he's eating about 50% of food. Documented as 75% a few days ago. Pt needs help eating at this time. No coughing or choking at this time, tolerating mech soft and nectar thick liquids. Pt states food is \"not appetizing. \" Continue ONS to increase PO intakes to meet nutrition needs. Nutrition labs are WNL. 10/4: 80 yr old female admitted for acute encephalopathy, AMS. PMHx: Parkinson's, dementia. Pt currently on Mechanical soft (chopped) Nectar thick liquids. SLP visited pt earlier today. Pt ate ~25% of lunch at RD visit. Documented as eating 75% of breakfast. Pt had help from family member to eat. Pt agreeable to trying Ensure pudding and magic cup, both chocolate flavor. Pt states no wt loss recently. Family states he's likely lost a few lbs (unable to quantify). No previous wt recorded in chart. Pt with obvious clavicular/temporal wasting, subcu fat and muscle loss with bulging vein on the left side of his neck. Stage II p/i to buttocks. 2/2 only meeting 1 criteria for malnutrition (subcu fat/muscle loss), cannot classify pt as malnourished. Unable to quantify wt loss, PO intakes, and no edema. Will continue to monitor if PO intakes <50% of needs. SUBJECTIVE/OBJECTIVE:   Diet Order: Puree, Other (comment) (Nectar)  % Eaten:  No data found.     Pertinent Medications: [x] Reviewed    Chemistries:  Lab Results   Component Value Date/Time    Sodium 137 10/08/2017 11:44 AM    Potassium 4.0 10/08/2017 11:44 AM    Chloride 102 10/08/2017 11:44 AM    CO2 28 10/08/2017 11:44 AM    Anion gap 7 10/08/2017 11:44 AM    Glucose 96 10/08/2017 11:44 AM    BUN 13 10/08/2017 11:44 AM    Creatinine 0.71 10/08/2017 11:44 AM    BUN/Creatinine ratio 18 10/08/2017 11:44 AM    GFR est AA >60 10/08/2017 11:44 AM    GFR est non-AA >60 10/08/2017 11:44 AM    Calcium 8.9 10/08/2017 11:44 AM    AST (SGOT) 183 10/03/2017 02:59 AM    Alk. phosphatase 54 10/03/2017 02:59 AM    Protein, total 5.7 10/03/2017 02:59 AM    Albumin 2.6 10/03/2017 02:59 AM    Globulin 3.1 10/03/2017 02:59 AM    A-G Ratio 0.8 10/03/2017 02:59 AM    ALT (SGPT) 58 10/03/2017 02:59 AM      Anthropometrics: Height: 6' (182.9 cm) Weight: 79.4 kg (175 lb)    IBW (%IBW):   ( ) UBW (%UBW):   (  %)    BMI: Body mass index is 23.73 kg/(m^2). This BMI is indicative of:   [] Underweight    [x] Normal    [] Overweight    []  Obesity    []  Extreme Obesity (BMI>40)  Estimated Nutrition Needs (Based on): 1972 Kcals/day (BMR(1517x1.3)) , 79 g (-87g/day(1.0-1.1g/kg)) Protein  Carbohydrate: At Least 130 g/day  Fluids: 2000 mL/day    Last BM: 10/4   [x]Active     []Hyperactive  []Hypoactive       [] Absent   BS  Skin:    [] Intact   [] Incision  [] Breakdown   [] DTI   [] Tears/Excoriation/Abrasion  []Edema [] Other:    Wt Readings from Last 30 Encounters:   10/02/17 79.4 kg (175 lb)      NUTRITION DIAGNOSES:   Problem:  Chewing/masticatory difficulty     Etiology: related to motor causes altered swallow pattern     Signs/Symptoms: as evidenced by SLP pureed/nectar thick liquids      NUTRITION INTERVENTIONS:  Meals/Snacks: General/healthful diet                  GOAL:   Pt will consume >50% of meals and ONS without s/s of aspiration within 2-4 days    Cultural, Jew, or Ethnic Dietary Needs: None     LEARNING NEEDS (Diet, Food/Nutrient-Drug Interaction):    [x] None Identified   [] Identified and Education Provided/Documented   [] Identified and Pt declined/was not appropriate      [x] Interdisciplinary Care Plan Reviewed/Documented    [x] Discharge Needs:    TBD   [] No Nutrition Related Discharge Needs    NUTRITION RISK:   Pt Is At Nutrition Risk  [x]     No Nutrition Risk Identified  []       PT SEEN FOR:    []  MD Consult: []Calorie Count      []Diabetic Diet Education        []Diet Education     []Electrolyte Management     []General Nutrition Management and Supplements     []Management of Tube Feeding     []TPN Recommendations    []  RN Referral:  []MST score >=2     []Enteral/Parenteral Nutrition PTA     []Pregnant: Gestational DM or Multigestation                 [] Pressure Ulcer      []  Low BMI      []  Length of Stay       [] Dysphagia Diet         [] Ventilator      [x]  Follow-Up      Previous Recommendations:   [x] Implemented          [] Not Implemented          [] Not Applicable    Previous Goal:   [x] Met              [] Progressing Towards Goal              [] Not Progressing Towards Goal   [] Not Applicable              Anshu Bloom, [address] [place] [address]   Pager [phone]

## 2017-10-09 NOTE — PROGRESS NOTES
Bedside and Verbal shift change report given to Ammon Luciano (oncoming nurse) by Gio Mata RN (offgoing nurse). Report included the following information SBAR, Kardex, MAR, Accordion and Recent Results.

## 2017-10-09 NOTE — PROGRESS NOTES
Problem: Falls - Risk of  Goal: *Absence of Falls  Document Lucero Fall Risk and appropriate interventions in the flowsheet.    Outcome: Progressing Towards Goal  Fall Risk Interventions:  Mobility Interventions: Bed/chair exit alarm, Communicate number of staff needed for ambulation/transfer, Patient to call before getting OOB, PT Consult for mobility concerns, PT Consult for assist device competence     Mentation Interventions: Bed/chair exit alarm, Door open when patient unattended, Reorient patient     Medication Interventions: Bed/chair exit alarm     Elimination Interventions: Call light in reach, Patient to call for help with toileting needs     History of Falls Interventions: Bed/chair exit alarm, Door open when patient unattended, Room close to nurse's station

## 2017-10-09 NOTE — WOUND CARE
Initial inpatient wound and skin care consult. Patient is alert and no complaints of pain at this time. PCT in room to assist with turning. Assessment:  All skin folds and bony prominences assessed. POA skin tears full thickness on left and right buttocks, left buttock 9 cm x 5 cm x 0.1 cm blanches, small amount serous drainage. Right side 1 cm x 0.5 cm x 0.1 cm blanches, small amount serous drainage. Heels dry and intact. No further skin problems noted. Treatment:  Zinc cream applied to skin tears and then covered with mepilex foam.   Patient repositioned on side with pillows.      Hema Whitten RN

## 2017-10-09 NOTE — PROGRESS NOTES
Bedside and Verbal shift change report given to Tang (oncoming nurse) by Paulino Underwood (offgoing nurse). Report included the following information SBAR, Kardex, Intake/Output, MAR, Accordion and Recent Results.

## 2017-10-10 VITALS
RESPIRATION RATE: 18 BRPM | BODY MASS INDEX: 23.7 KG/M2 | HEART RATE: 92 BPM | HEIGHT: 72 IN | DIASTOLIC BLOOD PRESSURE: 71 MMHG | WEIGHT: 175 LBS | SYSTOLIC BLOOD PRESSURE: 115 MMHG | TEMPERATURE: 98.2 F | OXYGEN SATURATION: 93 %

## 2017-10-10 PROCEDURE — 74011250636 HC RX REV CODE- 250/636: Performed by: INTERNAL MEDICINE

## 2017-10-10 PROCEDURE — 74011250637 HC RX REV CODE- 250/637: Performed by: NURSE PRACTITIONER

## 2017-10-10 RX ADMIN — Medication 10 ML: at 05:26

## 2017-10-10 RX ADMIN — Medication 10 ML: at 13:20

## 2017-10-10 RX ADMIN — SODIUM CHLORIDE 1000 ML: 900 INJECTION, SOLUTION INTRAVENOUS at 13:17

## 2017-10-10 RX ADMIN — CARBIDOPA AND LEVODOPA 0.5 TABLET: 25; 100 TABLET ORAL at 07:01

## 2017-10-10 RX ADMIN — CARBIDOPA AND LEVODOPA 1 TABLET: 25; 100 TABLET, EXTENDED RELEASE ORAL at 13:13

## 2017-10-10 RX ADMIN — CARBIDOPA AND LEVODOPA 0.5 TABLET: 25; 100 TABLET ORAL at 13:13

## 2017-10-10 RX ADMIN — CARBIDOPA AND LEVODOPA 1 TABLET: 25; 100 TABLET, EXTENDED RELEASE ORAL at 16:04

## 2017-10-10 RX ADMIN — CARBIDOPA AND LEVODOPA 1 TABLET: 25; 100 TABLET, EXTENDED RELEASE ORAL at 07:00

## 2017-10-10 RX ADMIN — CARBIDOPA AND LEVODOPA 0.5 TABLET: 25; 100 TABLET ORAL at 16:04

## 2017-10-10 NOTE — PROGRESS NOTES
Pt has been accepted by [facility] and they are able to take him today. Notified pt's cousins, [facility] and Zayda Marcel. Pt aware of d/c plan. Ambulance transport has been arranged for 4pm today. Nursing please call report to [phone] and ask for the [facility].   Micheal Tubbs LCSW

## 2017-10-10 NOTE — PROGRESS NOTES
Problem: Falls - Risk of  Goal: *Absence of Falls  Document Lucero Fall Risk and appropriate interventions in the flowsheet.    Outcome: Progressing Towards Goal  Fall Risk Interventions:  Mobility Interventions: Bed/chair exit alarm     Mentation Interventions: Bed/chair exit alarm, Door open when patient unattended     Medication Interventions: Bed/chair exit alarm     Elimination Interventions: Bed/chair exit alarm, Call light in reach, Toileting schedule/hourly rounds     History of Falls Interventions: Door open when patient unattended

## 2017-10-10 NOTE — PROGRESS NOTES
TRANSFER - OUT REPORT:    Verbal report given to [employer]) on Radha Capone  being transferred to SNF(unit) for routine progression of care       Report consisted of patients Situation, Background, Assessment and   Recommendations(SBAR). Information from the following report(s) SBAR, Kardex, Procedure Summary, Intake/Output and Recent Results was reviewed with the receiving nurse. Lines:   Peripheral IV 10/03/17 Left Forearm (Active)   Site Assessment Clean, dry, & intact 10/10/2017  8:00 AM   Phlebitis Assessment 0 10/10/2017  8:00 AM   Infiltration Assessment 0 10/10/2017  8:00 AM   Dressing Status Clean, dry, & intact 10/10/2017  8:00 AM   Dressing Type Transparent;Tape 10/10/2017  8:00 AM   Hub Color/Line Status Pink 10/10/2017  8:00 AM   Action Taken Open ports on tubing capped 10/10/2017  8:00 AM   Alcohol Cap Used Yes 10/10/2017  8:00 AM        Opportunity for questions and clarification was provided.       Patient transported with:   [employer]

## 2017-10-10 NOTE — PROGRESS NOTES
José Antonio Bakerelsen [facility] [address]  [address] [facility], [place], [address] [place] [address]  [phone]      Medical Progress Note      NAME: Caroline Belcher   :  1932  MRM:  [id number]    Date/Time: 10/10/2017  11:31 AM       Assessment and Plan:     Severe protein deficient malnutrition - POA, appreciate speech consult. Supplements as tolerated. Palliative consulted. This is end stage Parkingson disease, and he will die of it's complications in the near future. Parkinsons disease / Dysphagia / Dysarthria - Risk of aspiration reviewed by speech therapy, continue mech soft with nectar. Continue new Sinemet dose      Dementia without behavioral disturbance - Not on home medications. Now close to baseline. Consulted palliative.      Physical debility - Consulted PT/OT. Needs SNF, to deal with effects of parkinson's and FTT. I talked to family about likelihood of progression to full LTC, and death, in future     Hypotension - Likely his baseline. Not on any meds to cause this. Bolus NS one time. This will be a recurrent issue. Acute encephalopathy - POA, unclear etiology, now back to baseline. DDx dementia vs Parkinson's disease vs acute infection. UA ok, CXR normal. CT head unremarkable. Consulted neurology, without firm Dx      Acute bronchitis - POA, mild, Completed 5 day course of azithromycin for bronchitis. No sign of PNA, though aspiration certainly likely       Subjective:     Chief Complaint:  Weak, appears comfortable, words garbled, which is his baseline. Still waiting on SNF placement. ROS:  (bold if positive, if negative)      Tolerating some PT  Tolerating some Diet        Objective:     Last 24hrs VS reviewed since prior progress note.  Most recent are:    Visit Vitals    BP (!) 88/55 (BP 1 Location: Right arm, BP Patient Position: During activity)    Pulse 80    Temp 98.2 °F (36.8 °C)    Resp 20    Ht 6' (1.829 m)    Wt 79.4 kg (175 lb)    SpO2 92%    BMI 23.73 kg/m2 SpO2 Readings from Last 6 Encounters:   10/10/17 92%            Intake/Output Summary (Last 24 hours) at 10/10/17 1115  Last data filed at 10/10/17 0534   Gross per 24 hour   Intake              100 ml   Output                0 ml   Net              100 ml        Physical Exam:    Gen:  Frail, cachectic, in no acute distress  HEENT:  Pink conjunctivae, PERRL, hearing intact to voice, moist mucous membranes  Neck:  Supple, without masses, thyroid non-tender  Resp:  No accessory muscle use, clear breath sounds without wheezes rales or rhonchi  Card:  No murmurs, normal S1, S2 without thrills, bruits or peripheral edema  Abd:  Soft, non-tender, non-distended, normoactive bowel sounds are present, no mass  Lymph:  No cervical or inguinal adenopathy  Musc:  No cyanosis or clubbing  Skin:  No rashes or ulcers, skin turgor is reduced  Neuro:  Cranial nerves are grossly intact, general motor weakness, follows commands vaguely  Psych:  Unclear insight, oriented to person, place probably    Telemetry reviewed:   normal sinus rhythm  __________________________________________________________________  Medications Reviewed: (see below)  Medications:     Current Facility-Administered Medications   Medication Dose Route Frequency    carbidopa-levodopa ER (SINEMET CR)  mg per tablet 1 Tab  1 Tab Oral QID    carbidopa-levodopa (SINEMET)  mg per tablet 0.5 Tab  0.5 Tab Oral QID    sodium chloride (NS) flush 5-10 mL  5-10 mL IntraVENous Q8H    sodium chloride (NS) flush 5-10 mL  5-10 mL IntraVENous PRN    acetaminophen (TYLENOL) tablet 650 mg  650 mg Oral Q4H PRN    morphine injection 1 mg  1 mg IntraVENous Q4H PRN    naloxone (NARCAN) injection 0.4 mg  0.4 mg IntraVENous PRN    prochlorperazine (COMPAZINE) injection 5 mg  5 mg IntraVENous Q8H PRN    enoxaparin (LOVENOX) injection 40 mg  40 mg SubCUTAneous Q24H    sodium chloride (NS) flush 5-10 mL  5-10 mL IntraVENous PRN        Lab Data Reviewed: (see below)  Lab Review:     Recent Labs      10/08/17   1144   WBC  4.5   HGB  14.8   HCT  43.6   PLT  186     Recent Labs      10/08/17   1144   NA  137   K  4.0   CL  102   CO2  28   GLU  96   BUN  13   CREA  0.71   CA  8.9   MG  1.9     No results found for: GLUCPOC  No results for input(s): PH, PCO2, PO2, HCO3, FIO2 in the last 72 hours. No results for input(s): INR in the last 72 hours. No lab exists for component: Pollygreg Hernandezin  All Micro Results     Procedure Component Value Units Date/Time    CULTURE, BLOOD [[id number]] Collected:  10/02/17 2024    Order Status:  Completed Specimen:  Blood from Blood Updated:  10/08/17 0840     Special Requests: NO SPECIAL REQUESTS        Culture result: NO GROWTH 6 DAYS       CULTURE, BLOOD [[id number]] Collected:  10/02/17 2024    Order Status:  Completed Specimen:  Blood from Blood Updated:  10/08/17 0840     Special Requests: NO SPECIAL REQUESTS        Culture result: NO GROWTH 6 DAYS             I have reviewed notes of prior 24hr.     Other pertinent lab: none    Total time spent with patient: 28 40 [facility] discussed with: Patient, Care Manager, Nursing Staff and >50% of time spent in counseling and coordination of care    Discussed:  Care Plan and D/C Planning    Prophylaxis:  H2B/PPI    Disposition:  SNF/LTC           ___________________________________________________    Attending Physician: Valerio Vail MD

## 2017-10-10 NOTE — PROGRESS NOTES
Palliative Medicine Social Work Note      SW made visit to pt along with Palliative Medicine Physician Dr. Rianna Snowden. Pt was resting in bed but roused when greeted. No family present. Pt engaged in conversation but speech difficult to understand at times. Dr. Rianna Snowden assessed pt's level of understanding re: his medical condition before attempting to address AMD.  Pt appeared not to recall Parkinson's diagnosis. Conversation interrupted by arrival of Yarsani friend to visit. Care Manager is assisting with plans for SNF. Will continue to follow. Thank you for including Palliative Medicine in the care of Mr. Javed Pineda.     [address] [place] [address], [facility]-  Palliative Medicine:  [phone])

## 2017-10-10 NOTE — PROGRESS NOTES
Bedside and Verbal shift change report given to [facility] ROMA (oncoming nurse) by Nell Allen (offgoing nurse). Report included the following information SBAR, Kardex, Intake/Output, MAR and Accordion.

## 2017-12-02 NOTE — PROCEDURES
José Antonio Walker [facility]   [address] [facility], [address]   ROUTINE EEG REPORT       Name:  Kimberlee Guzman   MR#:  [id number]   :  1932   Account #:  [de-identified]    Date of Procedure:  10/03/2017   Date of Adm:  10/02/2017       This was with the idea of a change in mental status prior to admission. Routine scalp electrodes were applied according to the 10-20   international system. EKG monitor as well. The basic background rhythm at best around 6 Hz. No evidence of   focal slowing spontaneous electrocerebral discharges. EKG grossly   sinus rhythm. As the record proceeded, the patient got a little agitated   and was noted to be lethargic. Hyperventilation was not performed. Photic stimulation produced a positive entrainment at different   harmonics, but it was somewhat weak and attenuated, all things   considered. IMPRESSION: This is an abnormal EEG showing mild overall slowing   in the recording format. This suggested in turn global cerebral   depression on a toxic metabolic or primary degenerative basis. Clinical correlation is advised.         Lety Catalan MD      [facility] / Rubia Fowler   D:  2017   16:54   T:  2017   14:29   Job #:  [id number]

## 2024-01-25 NOTE — ED NOTES
Bedside and Verbal shift change report given to Yg Vazquez RN (oncoming nurse) by Bryan Charlton RN (offgoing nurse). Report included the following information SBAR, Kardex, ED Summary, Procedure Summary, Intake/Output, MAR and Recent Results.
Family updated on plan of care.
Pt downgraded to remote telemetry per Dr. Matilde Jeong. VSS.
TRANSFER - OUT REPORT:    Verbal report given to denisha Cherry(name) on Caroline Belcher  being transferred to [facility](unit) for routine progression of care       Report consisted of patients Situation, Background, Assessment and   Recommendations(SBAR). Information from the following report(s) SBAR, ED Summary, [facility] and Recent Results was reviewed with the receiving nurse. Lines:   Peripheral IV 10/02/17 Left;Upper Arm (Active)   Site Assessment Clean, dry, & intact 10/2/2017 11:00 PM   Phlebitis Assessment 0 10/2/2017 11:00 PM   Infiltration Assessment 0 10/2/2017 11:00 PM   Dressing Status Clean, dry, & intact 10/2/2017 11:00 PM   Dressing Type Transparent;Tape 10/2/2017 11:00 PM   Hub Color/Line Status Pink;End cap changed 10/2/2017 11:00 PM   Action Taken Blood drawn;Open ports on tubing capped 10/2/2017 11:00 PM        Opportunity for questions and clarification was provided.       Patient transported with:   [employer]
[email or web address]